# Patient Record
Sex: FEMALE | Race: WHITE | Employment: OTHER | URBAN - METROPOLITAN AREA
[De-identification: names, ages, dates, MRNs, and addresses within clinical notes are randomized per-mention and may not be internally consistent; named-entity substitution may affect disease eponyms.]

---

## 2021-04-12 ENCOUNTER — OFFICE VISIT (OUTPATIENT)
Dept: FAMILY MEDICINE CLINIC | Age: 51
End: 2021-04-12
Payer: MEDICARE

## 2021-04-12 VITALS
HEART RATE: 90 BPM | WEIGHT: 248.2 LBS | DIASTOLIC BLOOD PRESSURE: 92 MMHG | TEMPERATURE: 96.6 F | RESPIRATION RATE: 20 BRPM | SYSTOLIC BLOOD PRESSURE: 138 MMHG

## 2021-04-12 DIAGNOSIS — Z00.00 ANNUAL PHYSICAL EXAM: Primary | ICD-10-CM

## 2021-04-12 DIAGNOSIS — F31.60 BIPOLAR AFFECTIVE DISORDER, CURRENT EPISODE MIXED, CURRENT EPISODE SEVERITY UNSPECIFIED (HCC): ICD-10-CM

## 2021-04-12 DIAGNOSIS — I10 ESSENTIAL HYPERTENSION: ICD-10-CM

## 2021-04-12 DIAGNOSIS — E55.9 VITAMIN D DEFICIENCY: ICD-10-CM

## 2021-04-12 DIAGNOSIS — E11.65 UNCONTROLLED TYPE 2 DIABETES MELLITUS WITH HYPERGLYCEMIA (HCC): ICD-10-CM

## 2021-04-12 DIAGNOSIS — E78.5 HYPERLIPIDEMIA, UNSPECIFIED HYPERLIPIDEMIA TYPE: ICD-10-CM

## 2021-04-12 DIAGNOSIS — I27.9 CHRONIC PULMONARY HEART DISEASE (HCC): ICD-10-CM

## 2021-04-12 DIAGNOSIS — J44.9 CHRONIC OBSTRUCTIVE PULMONARY DISEASE, UNSPECIFIED COPD TYPE (HCC): ICD-10-CM

## 2021-04-12 DIAGNOSIS — Z12.31 ENCOUNTER FOR SCREENING MAMMOGRAM FOR MALIGNANT NEOPLASM OF BREAST: ICD-10-CM

## 2021-04-12 DIAGNOSIS — R79.89 ELEVATED LFTS: ICD-10-CM

## 2021-04-12 PROCEDURE — 99386 PREV VISIT NEW AGE 40-64: CPT | Performed by: NURSE PRACTITIONER

## 2021-04-12 RX ORDER — LISINOPRIL 30 MG/1
30 TABLET ORAL DAILY
Qty: 30 TABLET | Refills: 1 | Status: SHIPPED | OUTPATIENT
Start: 2021-04-12 | End: 2021-05-06

## 2021-04-12 RX ORDER — AMLODIPINE BESYLATE 5 MG/1
5 TABLET ORAL DAILY
COMMUNITY
Start: 2020-11-30 | End: 2021-04-12 | Stop reason: SDUPTHER

## 2021-04-12 RX ORDER — LISINOPRIL 30 MG/1
30 TABLET ORAL DAILY
COMMUNITY
Start: 2020-11-13 | End: 2021-04-12 | Stop reason: SDUPTHER

## 2021-04-12 RX ORDER — ATENOLOL 50 MG/1
50 TABLET ORAL 2 TIMES DAILY
COMMUNITY
Start: 2021-03-05 | End: 2021-04-12 | Stop reason: SDUPTHER

## 2021-04-12 RX ORDER — ATORVASTATIN CALCIUM 40 MG/1
40 TABLET, FILM COATED ORAL DAILY
COMMUNITY
Start: 2020-09-08 | End: 2021-04-12 | Stop reason: SDUPTHER

## 2021-04-12 RX ORDER — CHLORAL HYDRATE 500 MG
2 CAPSULE ORAL 2 TIMES DAILY
Status: ON HOLD | COMMUNITY
Start: 2020-11-25 | End: 2021-05-21

## 2021-04-12 RX ORDER — CLONAZEPAM 0.5 MG/1
0.5 TABLET ORAL 2 TIMES DAILY PRN
COMMUNITY
Start: 2021-04-01

## 2021-04-12 RX ORDER — BENZONATATE 100 MG/1
100-200 CAPSULE ORAL 3 TIMES DAILY PRN
Qty: 60 CAPSULE | Refills: 2 | Status: ON HOLD | OUTPATIENT
Start: 2021-04-12 | End: 2021-05-22 | Stop reason: HOSPADM

## 2021-04-12 RX ORDER — BUPROPION HYDROCHLORIDE 150 MG/1
300 TABLET ORAL EVERY MORNING
Status: ON HOLD | COMMUNITY
Start: 2021-03-05 | End: 2021-05-21

## 2021-04-12 RX ORDER — DICYCLOMINE HCL 20 MG
20 TABLET ORAL 3 TIMES DAILY PRN
COMMUNITY
Start: 2020-09-14 | End: 2021-06-10 | Stop reason: SDUPTHER

## 2021-04-12 RX ORDER — METFORMIN HYDROCHLORIDE 500 MG/1
1000 TABLET, EXTENDED RELEASE ORAL 2 TIMES DAILY
COMMUNITY
Start: 2020-09-17 | End: 2021-04-12 | Stop reason: SDUPTHER

## 2021-04-12 RX ORDER — DOXEPIN HYDROCHLORIDE 10 MG/1
20 CAPSULE ORAL NIGHTLY
COMMUNITY
Start: 2021-04-07

## 2021-04-12 RX ORDER — ATORVASTATIN CALCIUM 40 MG/1
40 TABLET, FILM COATED ORAL DAILY
Qty: 30 TABLET | Refills: 1 | Status: SHIPPED | OUTPATIENT
Start: 2021-04-12 | End: 2021-05-06

## 2021-04-12 RX ORDER — AMLODIPINE BESYLATE 5 MG/1
5 TABLET ORAL DAILY
Qty: 30 TABLET | Refills: 11 | Status: SHIPPED | OUTPATIENT
Start: 2021-04-12 | End: 2022-04-12

## 2021-04-12 RX ORDER — TRAZODONE HYDROCHLORIDE 50 MG/1
50 TABLET ORAL NIGHTLY
Status: ON HOLD | COMMUNITY
End: 2021-05-21

## 2021-04-12 RX ORDER — ATENOLOL 50 MG/1
50 TABLET ORAL 2 TIMES DAILY
Qty: 60 TABLET | Refills: 1 | Status: SHIPPED | OUTPATIENT
Start: 2021-04-12 | End: 2021-05-06

## 2021-04-12 RX ORDER — PHENOL 1.4 %
10 AEROSOL, SPRAY (ML) MUCOUS MEMBRANE NIGHTLY PRN
COMMUNITY

## 2021-04-12 RX ORDER — OXCARBAZEPINE 150 MG/1
150 TABLET, FILM COATED ORAL 2 TIMES DAILY
COMMUNITY
Start: 2021-03-24

## 2021-04-12 RX ORDER — METFORMIN HYDROCHLORIDE 500 MG/1
1000 TABLET, EXTENDED RELEASE ORAL 2 TIMES DAILY
Qty: 120 TABLET | Refills: 1 | Status: SHIPPED | OUTPATIENT
Start: 2021-04-12 | End: 2021-05-25

## 2021-04-12 SDOH — ECONOMIC STABILITY: FOOD INSECURITY: WITHIN THE PAST 12 MONTHS, THE FOOD YOU BOUGHT JUST DIDN'T LAST AND YOU DIDN'T HAVE MONEY TO GET MORE.: NEVER TRUE

## 2021-04-12 SDOH — ECONOMIC STABILITY: TRANSPORTATION INSECURITY
IN THE PAST 12 MONTHS, HAS THE LACK OF TRANSPORTATION KEPT YOU FROM MEDICAL APPOINTMENTS OR FROM GETTING MEDICATIONS?: NO

## 2021-04-12 SDOH — ECONOMIC STABILITY: TRANSPORTATION INSECURITY
IN THE PAST 12 MONTHS, HAS LACK OF TRANSPORTATION KEPT YOU FROM MEETINGS, WORK, OR FROM GETTING THINGS NEEDED FOR DAILY LIVING?: NO

## 2021-04-12 SDOH — ECONOMIC STABILITY: INCOME INSECURITY: HOW HARD IS IT FOR YOU TO PAY FOR THE VERY BASICS LIKE FOOD, HOUSING, MEDICAL CARE, AND HEATING?: NOT HARD AT ALL

## 2021-04-12 SDOH — ECONOMIC STABILITY: FOOD INSECURITY: WITHIN THE PAST 12 MONTHS, YOU WORRIED THAT YOUR FOOD WOULD RUN OUT BEFORE YOU GOT MONEY TO BUY MORE.: NEVER TRUE

## 2021-04-12 ASSESSMENT — PATIENT HEALTH QUESTIONNAIRE - PHQ9
1. LITTLE INTEREST OR PLEASURE IN DOING THINGS: 0
SUM OF ALL RESPONSES TO PHQ QUESTIONS 1-9: 0
2. FEELING DOWN, DEPRESSED OR HOPELESS: 0

## 2021-04-12 NOTE — PROGRESS NOTES
No results found for: LABA1C  No results found for: LABMICR, CREATININE  No results found for: ALT, AST  No results found for: CHOL, TRIG, HDL, LDLCALC, LDLDIRECT       Follows up with Minda on 4/21/2021 for bipolar and medications. PTSD and anxiety. Wellbutrin, trileptal and klonopin. COPD- cough for months. She was taking tessalon for the cough. Has not followed up with pulmonary for her COPD recently. Review of Systems   Constitutional: Negative for chills, fatigue and fever. HENT: Negative for congestion, facial swelling, sinus pain, sore throat and trouble swallowing. Eyes: Negative for pain and visual disturbance. Respiratory: Positive for cough (COPD). Negative for shortness of breath and wheezing. Cardiovascular: Positive for leg swelling. Negative for chest pain and palpitations. Gastrointestinal: Negative for abdominal pain, diarrhea, nausea and vomiting. Genitourinary: Negative for difficulty urinating, dysuria and urgency. Musculoskeletal: Negative for back pain, gait problem and neck pain. Skin: Negative for color change and rash. Neurological: Negative for dizziness, weakness and headaches. Psychiatric/Behavioral: Negative for agitation and sleep disturbance. The patient is not nervous/anxious. Prior to Visit Medications    Medication Sig Taking?  Authorizing Provider   benzonatate (TESSALON) 100 MG capsule Take 1-2 capsules by mouth 3 times daily as needed for Cough Yes NAIF Simons CNP   dicyclomine (BENTYL) 20 MG tablet Take 20 mg by mouth 3 times daily as needed Yes Historical Provider, MD   Omega-3 Fatty Acids (FISH OIL) 1000 MG CAPS Take 2 g by mouth 2 times daily Yes Historical Provider, MD   insulin aspart (NOVOLOG) 100 UNIT/ML injection vial Inject 12 Units into the skin 3 times daily (before meals) 12 unit AM, 12 units Afternoon, 24 units in PM Yes NAIF Simons CNP   amLODIPine (NORVASC) 5 MG tablet Take 1 tablet by mouth daily Yes Branden Borja, NAIF - CNP   atenolol (TENORMIN) 50 MG tablet Take 1 tablet by mouth 2 times daily Yes Branden BorjaNAIF - CNP   atorvastatin (LIPITOR) 40 MG tablet Take 1 tablet by mouth daily Yes Branden Borja, APRN - CNP   lisinopril (PRINIVIL;ZESTRIL) 30 MG tablet Take 1 tablet by mouth daily Yes Branden Borja, APRN - CNP   metFORMIN (GLUCOPHAGE-XR) 500 MG extended release tablet Take 2 tablets by mouth 2 times daily Yes Branden Borja, APRN - CNP   buPROPion (WELLBUTRIN XL) 150 MG extended release tablet   Historical Provider, MD   clonazePAM (KLONOPIN) 0.5 MG tablet Take 0.5 mg by mouth 2 times daily as needed. Historical Provider, MD   doxepin (SINEQUAN) 10 MG capsule   Historical Provider, MD   Melatonin 10 MG TABS Take 10 mg by mouth nightly as needed  Historical Provider, MD   OXcarbazepine (TRILEPTAL) 150 MG tablet   Historical Provider, MD   traZODone (DESYREL) 50 MG tablet Take 50 mg by mouth nightly  Historical Provider, MD        Allergies   Allergen Reactions    Adhesive Tape     Penicillins        No past medical history on file. No past surgical history on file.     Social History     Socioeconomic History    Marital status: Single     Spouse name: Not on file    Number of children: Not on file    Years of education: Not on file    Highest education level: Not on file   Occupational History    Not on file   Social Needs    Financial resource strain: Not hard at all    Food insecurity     Worry: Never true     Inability: Never true   [employer] needs     Medical: No     Non-medical: No   Tobacco Use    Smoking status: Current Every Day Smoker     Packs/day: 1.50     Years: 41.00     Pack years: 61.50    Smokeless tobacco: Never Used   Substance and Sexual Activity    Alcohol use: Not on file    Drug use: Not on file    Sexual activity: Not on file   Lifestyle    Physical activity     Days per week: Not on file     Minutes per session: Not on file    Stress: Not on file Relationships    Social connections     Talks on phone: Not on file     Gets together: Not on file     Attends Anabaptism service: Not on file     Active member of club or organization: Not on file     Attends meetings of clubs or organizations: Not on file     Relationship status: Not on file    Intimate partner violence     Fear of current or ex partner: Not on file     Emotionally abused: Not on file     Physically abused: Not on file     Forced sexual activity: Not on file   Other Topics Concern    Not on file   Social History Narrative    Not on file        No family history on file. Vitals:    04/12/21 1310 04/12/21 1314   BP: (!) 138/90 (!) 138/92   Site: Left Upper Arm Left Upper Arm   Position: Sitting Sitting   Cuff Size: Large Adult Large Adult   Pulse: 90    Resp: 20    Temp: 96.6 °F (35.9 °C)    TempSrc: Temporal    Weight: 248 lb 3.2 oz (112.6 kg)      There is no height or weight on file to calculate BMI. Physical Exam  Vitals signs reviewed. Constitutional:       General: She is not in acute distress. Appearance: Normal appearance. She is well-developed. HENT:      Head: Normocephalic and atraumatic. Right Ear: Hearing, tympanic membrane, ear canal and external ear normal.      Left Ear: Hearing, tympanic membrane, ear canal and external ear normal.      Nose: Nose normal. No nasal tenderness. Mouth/Throat:      Lips: Pink. Mouth: Mucous membranes are moist. No oral lesions. Pharynx: Oropharynx is clear. Uvula midline. Eyes:      General:         Right eye: No discharge. Left eye: No discharge. Conjunctiva/sclera: Conjunctivae normal.   Neck:      Musculoskeletal: Full passive range of motion without pain, normal range of motion and neck supple. Vascular: No carotid bruit. Trachea: No tracheal deviation. Cardiovascular:      Rate and Rhythm: Normal rate and regular rhythm. Pulses: Normal pulses.       Heart sounds: Normal heart sounds. No murmur. Pulmonary:      Effort: Pulmonary effort is normal. No respiratory distress. Breath sounds: Normal breath sounds. Abdominal:      General: Bowel sounds are normal.      Palpations: Abdomen is soft. Tenderness: There is no abdominal tenderness. There is no right CVA tenderness or left CVA tenderness. Musculoskeletal:      Right lower leg: Edema present. Left lower leg: Edema present. Lymphadenopathy:      Head:      Right side of head: No submental, submandibular, tonsillar, preauricular, posterior auricular or occipital adenopathy. Left side of head: No submental, submandibular, tonsillar, preauricular, posterior auricular or occipital adenopathy. Cervical: No cervical adenopathy. Skin:     General: Skin is warm and dry. Findings: No rash. Neurological:      General: No focal deficit present. Mental Status: She is alert and oriented to person, place, and time. Coordination: Coordination normal.   Psychiatric:         Mood and Affect: Mood normal.         Behavior: Behavior normal.         Thought Content: Thought content normal.         Judgment: Judgment normal.         ASSESSMENT/PLAN:  1. Annual physical exam    2. Bipolar affective disorder, current episode mixed, current episode severity unspecified ([facility] [address]    3. Chronic obstructive pulmonary disease, unspecified COPD type ([facility] [address]  - Amos Apley, MD, Pulmonary Disease, [facility]    4. Uncontrolled type 2 diabetes mellitus with hyperglycemia (HCC)  - Microalbumin / Creatinine Urine Ratio; Future  - Hemoglobin A1C; Future  - [facility]  - insulin aspart (NOVOLOG) 100 UNIT/ML injection vial; Inject 12 Units into the skin 3 times daily (before meals) 12 unit AM, 12 units Afternoon, 24 units in PM  Dispense: 1 vial; Refill: 3  - metFORMIN (GLUCOPHAGE-XR) 500 MG extended release tablet; Take 2 tablets by mouth 2 times daily  Dispense: 120 tablet; Refill: 1    5.  Essential hypertension  - TSH without Reflex; Future  - T4, Free; Future  - Comprehensive Metabolic Panel; Future  - CBC Auto Differential; Future  - Nishant Garza MD, Cardiology, [facility] [address] [facility]  - amLODIPine (NORVASC) 5 MG tablet; Take 1 tablet by mouth daily  Dispense: 30 tablet; Refill: 11  - atenolol (TENORMIN) 50 MG tablet; Take 1 tablet by mouth 2 times daily  Dispense: 60 tablet; Refill: 1  - lisinopril (PRINIVIL;ZESTRIL) 30 MG tablet; Take 1 tablet by mouth daily  Dispense: 30 tablet; Refill: 1    6. Elevated LFTs    7. Hyperlipidemia, unspecified hyperlipidemia type  - Lipid Panel; Future  - [facility]  - atorvastatin (LIPITOR) 40 MG tablet; Take 1 tablet by mouth daily  Dispense: 30 tablet; Refill: 1    8. Vitamin D deficiency  - Vitamin D 25 Hydroxy; Future    9. [facility] pulmonary [facility] disease ([facility]  - Nishant Garza MD, Cardiology, [facility] SONJA JEAN    10. Encounter for screening mammogram for malignant neoplasm of breast  - MIRTHA DIGITAL POST SCREEN W OR WO CAD BILATERAL; Future    - Have labs completed after 12 hours fasting  - Appointment made for pt for COVID vaccine on 4/13/21  - Appointment made for Mammogram for 4/14/2021 (last mammo done 7/16/2019 per care everywhere)  - Colonoscopy- Done 7/2/2019 per care everywhere    Return in about 2 weeks (around 4/26/2021), or if symptoms worsen or fail to improve, for Routine follow up, Medication check, Result discussion. Patient given educational materials - see patient instructions. Discussed use, benefit, and side effects of prescribed medications. All patient questions answered. Pt voiced understanding. Reviewed health maintenance. An  electronic signature was used to authenticate this note.     --NAIF Watkins - CNP on 4/13/2021 at 10:15 AM

## 2021-04-13 ENCOUNTER — IMMUNIZATION (OUTPATIENT)
Dept: PRIMARY CARE CLINIC | Age: 51
End: 2021-04-13
Payer: MEDICARE

## 2021-04-13 PROCEDURE — 91300 COVID-19, PFIZER VACCINE 30MCG/0.3ML DOSE: CPT

## 2021-04-13 PROCEDURE — 0001A COVID-19, PFIZER VACCINE 30MCG/0.3ML DOSE: CPT

## 2021-04-13 ASSESSMENT — ENCOUNTER SYMPTOMS
ABDOMINAL PAIN: 0
DIARRHEA: 0
NAUSEA: 0
TROUBLE SWALLOWING: 0
SORE THROAT: 0
BACK PAIN: 0
WHEEZING: 0
COLOR CHANGE: 0
EYE PAIN: 0
FACIAL SWELLING: 0
SINUS PAIN: 0
VOMITING: 0
COUGH: 1
SHORTNESS OF BREATH: 0

## 2021-04-14 ENCOUNTER — HOSPITAL ENCOUNTER (OUTPATIENT)
Dept: WOMENS IMAGING | Age: 51
Discharge: HOME | End: 2021-04-14
Payer: MEDICARE

## 2021-04-14 DIAGNOSIS — Z12.31 ENCOUNTER FOR SCREENING MAMMOGRAM FOR MALIGNANT NEOPLASM OF BREAST: ICD-10-CM

## 2021-04-14 PROCEDURE — 77063 BREAST TOMOSYNTHESIS BI: CPT

## 2021-04-19 ENCOUNTER — NURSE ONLY (OUTPATIENT)
Dept: LAB | Age: 51
End: 2021-04-19

## 2021-04-19 DIAGNOSIS — E78.5 HYPERLIPIDEMIA, UNSPECIFIED HYPERLIPIDEMIA TYPE: ICD-10-CM

## 2021-04-19 DIAGNOSIS — E55.9 VITAMIN D DEFICIENCY: ICD-10-CM

## 2021-04-19 DIAGNOSIS — E11.65 UNCONTROLLED TYPE 2 DIABETES MELLITUS WITH HYPERGLYCEMIA (HCC): ICD-10-CM

## 2021-04-19 DIAGNOSIS — I10 ESSENTIAL HYPERTENSION: ICD-10-CM

## 2021-04-19 LAB
ALBUMIN SERPL-MCNC: 4.3 G/DL (ref 3.5–5.1)
ALP BLD-CCNC: 80 U/L (ref 38–126)
ALT SERPL-CCNC: 22 U/L (ref 11–66)
ANION GAP SERPL CALCULATED.3IONS-SCNC: 15 MEQ/L (ref 8–16)
AST SERPL-CCNC: 20 U/L (ref 5–40)
AVERAGE GLUCOSE: 189 MG/DL (ref 70–126)
BASOPHILS # BLD: 0.8 %
BASOPHILS ABSOLUTE: 0.1 THOU/MM3 (ref 0–0.1)
BILIRUB SERPL-MCNC: 0.7 MG/DL (ref 0.3–1.2)
BUN BLDV-MCNC: 10 MG/DL (ref 7–22)
CALCIUM SERPL-MCNC: 9 MG/DL (ref 8.5–10.5)
CHLORIDE BLD-SCNC: 97 MEQ/L (ref 98–111)
CHOLESTEROL, TOTAL: 215 MG/DL (ref 100–199)
CO2: 24 MEQ/L (ref 23–33)
CREAT SERPL-MCNC: 0.7 MG/DL (ref 0.4–1.2)
CREATININE, URINE: 297.2 MG/DL
EOSINOPHIL # BLD: 1.8 %
EOSINOPHILS ABSOLUTE: 0.2 THOU/MM3 (ref 0–0.4)
ERYTHROCYTE [DISTWIDTH] IN BLOOD BY AUTOMATED COUNT: 13.9 % (ref 11.5–14.5)
ERYTHROCYTE [DISTWIDTH] IN BLOOD BY AUTOMATED COUNT: 46.2 FL (ref 35–45)
GFR SERPL CREATININE-BSD FRML MDRD: 88 ML/MIN/1.73M2
GLUCOSE BLD-MCNC: 190 MG/DL (ref 70–108)
HBA1C MFR BLD: 8.3 % (ref 4.4–6.4)
HCT VFR BLD CALC: 53.8 % (ref 37–47)
HDLC SERPL-MCNC: 32 MG/DL
HEMOGLOBIN: 17.8 GM/DL (ref 12–16)
IMMATURE GRANS (ABS): 0.05 THOU/MM3 (ref 0–0.07)
IMMATURE GRANULOCYTES: 0.4 %
LDL CHOLESTEROL CALCULATED: 121 MG/DL
LYMPHOCYTES # BLD: 24.5 %
LYMPHOCYTES ABSOLUTE: 2.8 THOU/MM3 (ref 1–4.8)
MCH RBC QN AUTO: 30.2 PG (ref 26–33)
MCHC RBC AUTO-ENTMCNC: 33.1 GM/DL (ref 32.2–35.5)
MCV RBC AUTO: 91.2 FL (ref 81–99)
MICROALBUMIN UR-MCNC: 188.75 MG/DL
MICROALBUMIN/CREAT UR-RTO: 635 MG/G (ref 0–30)
MONOCYTES # BLD: 5.5 %
MONOCYTES ABSOLUTE: 0.6 THOU/MM3 (ref 0.4–1.3)
NUCLEATED RED BLOOD CELLS: 0 /100 WBC
PLATELET # BLD: 245 THOU/MM3 (ref 130–400)
PMV BLD AUTO: 9.6 FL (ref 9.4–12.4)
POTASSIUM SERPL-SCNC: 3.5 MEQ/L (ref 3.5–5.2)
RBC # BLD: 5.9 MILL/MM3 (ref 4.2–5.4)
SEG NEUTROPHILS: 67 %
SEGMENTED NEUTROPHILS ABSOLUTE COUNT: 7.8 THOU/MM3 (ref 1.8–7.7)
SODIUM BLD-SCNC: 136 MEQ/L (ref 135–145)
T4 FREE: 1.28 NG/DL (ref 0.93–1.76)
TOTAL PROTEIN: 7.6 G/DL (ref 6.1–8)
TRIGL SERPL-MCNC: 310 MG/DL (ref 0–199)
TSH SERPL DL<=0.05 MIU/L-ACNC: 1.21 UIU/ML (ref 0.4–4.2)
VITAMIN D 25-HYDROXY: 21 NG/ML (ref 30–100)
WBC # BLD: 11.6 THOU/MM3 (ref 4.8–10.8)

## 2021-04-20 ENCOUNTER — TELEPHONE (OUTPATIENT)
Dept: FAMILY MEDICINE CLINIC | Age: 51
End: 2021-04-20

## 2021-04-21 ENCOUNTER — HOSPITAL ENCOUNTER (OUTPATIENT)
Dept: WOMENS IMAGING | Age: 51
Discharge: HOME | End: 2021-04-21

## 2021-04-21 DIAGNOSIS — Z00.6 ENCOUNTER FOR EXAMINATION FOR NORMAL COMPARISON OR CONTROL IN CLINICAL RESEARCH PROGRAM: ICD-10-CM

## 2021-04-21 RX ORDER — INSULIN ASPART 100 [IU]/ML
12 INJECTION, SOLUTION INTRAVENOUS; SUBCUTANEOUS
Qty: 5 PEN | Refills: 3 | Status: SHIPPED | OUTPATIENT
Start: 2021-04-21

## 2021-04-21 NOTE — TELEPHONE ENCOUNTER
Noted.  New order placed. -WS    Orders Placed This Encounter   Medications    insulin aspart (NOVOLOG FLEXPEN) 100 UNIT/ML injection pen     Sig: Inject 12 Units into the skin 3 times daily (before meals) Inject 12 units before breakfast and lunch and 24 units before dinner.      Dispense:  5 pen     Refill:  3

## 2021-04-26 ENCOUNTER — PATIENT MESSAGE (OUTPATIENT)
Dept: FAMILY MEDICINE CLINIC | Age: 51
End: 2021-04-26

## 2021-04-26 ENCOUNTER — OFFICE VISIT (OUTPATIENT)
Dept: FAMILY MEDICINE CLINIC | Age: 51
End: 2021-04-26
Payer: MEDICARE

## 2021-04-26 VITALS
WEIGHT: 248 LBS | SYSTOLIC BLOOD PRESSURE: 136 MMHG | DIASTOLIC BLOOD PRESSURE: 72 MMHG | RESPIRATION RATE: 18 BRPM | HEART RATE: 68 BPM

## 2021-04-26 DIAGNOSIS — Z96.89 S/P INSERTION OF SPINAL CORD STIMULATOR: ICD-10-CM

## 2021-04-26 DIAGNOSIS — I10 ESSENTIAL HYPERTENSION: ICD-10-CM

## 2021-04-26 DIAGNOSIS — E78.5 HYPERLIPIDEMIA, UNSPECIFIED HYPERLIPIDEMIA TYPE: ICD-10-CM

## 2021-04-26 DIAGNOSIS — F17.200 TOBACCO USE DISORDER: ICD-10-CM

## 2021-04-26 DIAGNOSIS — E11.65 UNCONTROLLED TYPE 2 DIABETES MELLITUS WITH HYPERGLYCEMIA (HCC): Primary | ICD-10-CM

## 2021-04-26 DIAGNOSIS — M51.36 DDD (DEGENERATIVE DISC DISEASE), LUMBAR: ICD-10-CM

## 2021-04-26 DIAGNOSIS — F31.60 BIPOLAR AFFECTIVE DISORDER, CURRENT EPISODE MIXED, CURRENT EPISODE SEVERITY UNSPECIFIED (HCC): ICD-10-CM

## 2021-04-26 PROCEDURE — G8427 DOCREV CUR MEDS BY ELIG CLIN: HCPCS | Performed by: NURSE PRACTITIONER

## 2021-04-26 PROCEDURE — 3017F COLORECTAL CA SCREEN DOC REV: CPT | Performed by: NURSE PRACTITIONER

## 2021-04-26 PROCEDURE — G8421 BMI NOT CALCULATED: HCPCS | Performed by: NURSE PRACTITIONER

## 2021-04-26 PROCEDURE — 2022F DILAT RTA XM EVC RTNOPTHY: CPT | Performed by: NURSE PRACTITIONER

## 2021-04-26 PROCEDURE — 99214 OFFICE O/P EST MOD 30 MIN: CPT | Performed by: NURSE PRACTITIONER

## 2021-04-26 PROCEDURE — 4004F PT TOBACCO SCREEN RCVD TLK: CPT | Performed by: NURSE PRACTITIONER

## 2021-04-26 PROCEDURE — 3052F HG A1C>EQUAL 8.0%<EQUAL 9.0%: CPT | Performed by: NURSE PRACTITIONER

## 2021-04-26 RX ORDER — GLIMEPIRIDE 2 MG/1
2 TABLET ORAL EVERY MORNING
Qty: 90 TABLET | Refills: 1 | Status: SHIPPED | OUTPATIENT
Start: 2021-04-26

## 2021-04-26 RX ORDER — METAXALONE 800 MG/1
800 TABLET ORAL 3 TIMES DAILY
Qty: 90 TABLET | Refills: 3 | Status: SHIPPED | OUTPATIENT
Start: 2021-04-26 | End: 2021-04-27

## 2021-04-26 NOTE — PROGRESS NOTES
[facility]  [address] [address] [address]  Dept: [phone]  Dept Fax: [phone]: [phone]     2021     Dejuan Walker (:  1970) is a 44 y.o. female, here for evaluation of the following medical concerns:    Chief Complaint   Patient presents with    Follow-up     does complain of worsening body aches, does not feel currentyl ill, not sleeping well        HPI    Pt presents to the office today for 2 week follow up after new patient appt and labs. Pt has appointments scheduled for diabetic clinic and cardiology. Treatment Adherence:   Medication compliance:  compliant all of the time  Diet compliance:  compliant most of the time  Weight trend: stable  Current exercise: no regular exercise  Barriers: impairment:  physical: chronic pain    Diabetes Mellitus Type 2: Current symptoms/problems include none. Home blood sugar records: fasting range: 150's  Any episodes of hypoglycemia? no  Eye exam current (within one year): no  Tobacco history: She  reports that she has been smoking. She has a 61.50 pack-year smoking history. She has never used smokeless tobacco.   Daily Aspirin? Yes    Hypertension:  Home blood pressure monitoring: No.  She is adherent to a low sodium diet. Patient denies chest pain, shortness of breath, headache and lightheadedness. Antihypertensive medication side effects: no medication side effects noted. Use of agents associated with hypertension: none. Hyperlipidemia:  No new myalgias or GI upset on atorvastatin (Lipitor).        Lab Results   Component Value Date    LABA1C 8.3 (H) 2021     Lab Results   Component Value Date    LABMICR 188.75 2021    CREATININE 0.7 2021     Lab Results   Component Value Date    ALT 22 2021    AST 20 2021     Lab Results   Component Value Date    CHOL 215 (H) 2021    TRIG 310 (H) 2021    HDL 32 2021    LDLCALC 121 04/19/2021        Pt does report that she is having increased back pain. She has a nerve stimulator in place, but it is not working as well as it has in the past.  She would like a referral to pain management for possible injections or nerve ablation. Bipolar- Pt needs a referral to [facility] for Dr Govind Weems and continued psychotherapy for continued services for her insurance. The insurance will no longer cover the NP. Next appt is May 18th. Review of Systems   Constitutional: Negative for chills, fatigue and fever. HENT: Negative for congestion, facial swelling, sinus pain, sore throat and trouble swallowing. Eyes: Negative for pain and visual disturbance. Respiratory: Negative for cough, shortness of breath and wheezing. Cardiovascular: Negative for chest pain and palpitations. Gastrointestinal: Negative for abdominal pain, diarrhea, nausea and vomiting. Genitourinary: Negative for difficulty urinating, dysuria and urgency. Musculoskeletal: Positive for arthralgias, back pain and myalgias. Negative for gait problem and neck pain. Skin: Negative for color change and rash. Neurological: Negative for dizziness, weakness and headaches. Psychiatric/Behavioral: Negative for agitation and sleep disturbance. The patient is not nervous/anxious. Prior to Visit Medications    Medication Sig Taking? Authorizing Provider   glimepiride (AMARYL) 2 MG tablet Take 1 tablet by mouth every morning Yes NAIF Garcia CNP   metaxalone (SKELAXIN) 800 MG tablet Take 1 tablet by mouth 3 times daily Yes NAIF Garcia CNP   insulin aspart (NOVOLOG FLEXPEN) 100 UNIT/ML injection pen Inject 12 Units into the skin 3 times daily (before meals) Inject 12 units before breakfast and lunch and 24 units before dinner.  Yes NAIF Garcia CNP   benzonatate (TESSALON) 100 MG capsule Take 1-2 capsules by mouth 3 times daily as needed for Cough Yes NAIF Garcia CNP   buPROPion

## 2021-04-27 RX ORDER — CYCLOBENZAPRINE HCL 10 MG
10 TABLET ORAL 3 TIMES DAILY PRN
Qty: 90 TABLET | Refills: 2 | Status: ON HOLD | OUTPATIENT
Start: 2021-04-27 | End: 2021-05-22 | Stop reason: HOSPADM

## 2021-04-27 ASSESSMENT — ENCOUNTER SYMPTOMS
ABDOMINAL PAIN: 0
COUGH: 0
COLOR CHANGE: 0
SHORTNESS OF BREATH: 0
EYE PAIN: 0
TROUBLE SWALLOWING: 0
BACK PAIN: 1
FACIAL SWELLING: 0
SORE THROAT: 0
DIARRHEA: 0
WHEEZING: 0
VOMITING: 0
NAUSEA: 0
SINUS PAIN: 0

## 2021-04-27 NOTE — PROGRESS NOTES
I called [facility] at [phone] and spoke to [facility] and there is no referral needed for Dr. Meryle Beets or Ander Steinberg because they are both in network. Call reference number is [id number]. Dr. Bear Diss [phone]  Ander Steinberg NPI [id number]     Pt notified.

## 2021-05-05 DIAGNOSIS — E78.5 HYPERLIPIDEMIA, UNSPECIFIED HYPERLIPIDEMIA TYPE: ICD-10-CM

## 2021-05-05 DIAGNOSIS — I10 ESSENTIAL HYPERTENSION: ICD-10-CM

## 2021-05-06 RX ORDER — ATORVASTATIN CALCIUM 40 MG/1
TABLET, FILM COATED ORAL
Qty: 30 TABLET | Refills: 1 | Status: SHIPPED | OUTPATIENT
Start: 2021-05-06

## 2021-05-06 RX ORDER — LISINOPRIL 30 MG/1
TABLET ORAL
Qty: 30 TABLET | Refills: 1 | Status: SHIPPED | OUTPATIENT
Start: 2021-05-06

## 2021-05-06 RX ORDER — ATENOLOL 50 MG/1
50 TABLET ORAL 2 TIMES DAILY
Qty: 60 TABLET | Refills: 1 | Status: SHIPPED | OUTPATIENT
Start: 2021-05-06 | End: 2021-05-06 | Stop reason: SDUPTHER

## 2021-05-06 RX ORDER — ATENOLOL 50 MG/1
50 TABLET ORAL 2 TIMES DAILY
Qty: 60 TABLET | Refills: 1 | Status: SHIPPED | OUTPATIENT
Start: 2021-05-06 | End: 2021-07-22

## 2021-05-13 ENCOUNTER — IMMUNIZATION (OUTPATIENT)
Dept: PRIMARY CARE CLINIC | Age: 51
End: 2021-05-13
Payer: MEDICARE

## 2021-05-13 PROCEDURE — 91300 COVID-19, PFIZER VACCINE 30MCG/0.3ML DOSE: CPT | Performed by: FAMILY MEDICINE

## 2021-05-13 PROCEDURE — 0002A COVID-19, PFIZER VACCINE 30MCG/0.3ML DOSE: CPT | Performed by: FAMILY MEDICINE

## 2021-05-18 ENCOUNTER — HOSPITAL ENCOUNTER (OUTPATIENT)
Dept: WOMENS IMAGING | Age: 51
Discharge: HOME | DRG: 065 | End: 2021-05-18
Payer: MEDICARE

## 2021-05-18 DIAGNOSIS — R92.2 BREAST DENSITY: ICD-10-CM

## 2021-05-18 PROCEDURE — 76642 ULTRASOUND BREAST LIMITED: CPT

## 2021-05-21 ENCOUNTER — APPOINTMENT (OUTPATIENT)
Dept: CT IMAGING | Age: 51
DRG: 065 | End: 2021-05-21
Payer: MEDICARE

## 2021-05-21 ENCOUNTER — APPOINTMENT (OUTPATIENT)
Dept: INTERVENTIONAL RADIOLOGY/VASCULAR | Age: 51
DRG: 065 | End: 2021-05-21
Payer: MEDICARE

## 2021-05-21 ENCOUNTER — HOSPITAL ENCOUNTER (INPATIENT)
Age: 51
LOS: 1 days | Discharge: HOME | DRG: 065 | End: 2021-05-22
Attending: EMERGENCY MEDICINE
Payer: MEDICARE

## 2021-05-21 DIAGNOSIS — R29.818 NEUROLOGICAL ABNORMALITY: Primary | ICD-10-CM

## 2021-05-21 LAB
ANION GAP SERPL CALCULATED.3IONS-SCNC: 12 MEQ/L (ref 8–16)
BACTERIA: ABNORMAL /HPF
BASOPHILS # BLD: 0.8 %
BASOPHILS ABSOLUTE: 0.1 THOU/MM3 (ref 0–0.1)
BILIRUBIN URINE: NEGATIVE
BLOOD, URINE: NEGATIVE
BUN BLDV-MCNC: 9 MG/DL (ref 7–22)
CALCIUM SERPL-MCNC: 9 MG/DL (ref 8.5–10.5)
CASTS 2: ABNORMAL /LPF
CASTS UA: ABNORMAL /LPF
CHARACTER, URINE: CLEAR
CHLORIDE BLD-SCNC: 101 MEQ/L (ref 98–111)
CO2: 23 MEQ/L (ref 23–33)
COLOR: YELLOW
CREAT SERPL-MCNC: 0.6 MG/DL (ref 0.4–1.2)
CRYSTALS, UA: ABNORMAL
EKG ATRIAL RATE: 73 BPM
EKG P AXIS: 56 DEGREES
EKG P-R INTERVAL: 168 MS
EKG Q-T INTERVAL: 398 MS
EKG QRS DURATION: 92 MS
EKG QTC CALCULATION (BAZETT): 438 MS
EKG R AXIS: 47 DEGREES
EKG T AXIS: 48 DEGREES
EKG VENTRICULAR RATE: 73 BPM
EOSINOPHIL # BLD: 2.9 %
EOSINOPHILS ABSOLUTE: 0.2 THOU/MM3 (ref 0–0.4)
EPITHELIAL CELLS, UA: ABNORMAL /HPF
ERYTHROCYTE [DISTWIDTH] IN BLOOD BY AUTOMATED COUNT: 13.5 % (ref 11.5–14.5)
ERYTHROCYTE [DISTWIDTH] IN BLOOD BY AUTOMATED COUNT: 44.5 FL (ref 35–45)
GFR SERPL CREATININE-BSD FRML MDRD: > 90 ML/MIN/1.73M2
GLUCOSE BLD-MCNC: 121 MG/DL (ref 70–108)
GLUCOSE BLD-MCNC: 134 MG/DL (ref 70–108)
GLUCOSE URINE: 250 MG/DL
HCT VFR BLD CALC: 47.4 % (ref 37–47)
HEMOGLOBIN: 15.8 GM/DL (ref 12–16)
IMMATURE GRANS (ABS): 0.05 THOU/MM3 (ref 0–0.07)
IMMATURE GRANULOCYTES: 0.6 %
KETONES, URINE: NEGATIVE
LEUKOCYTE ESTERASE, URINE: ABNORMAL
LV EF: 60 %
LVEF MODALITY: NORMAL
LYMPHOCYTES # BLD: 24.7 %
LYMPHOCYTES ABSOLUTE: 2.1 THOU/MM3 (ref 1–4.8)
MCH RBC QN AUTO: 29.9 PG (ref 26–33)
MCHC RBC AUTO-ENTMCNC: 33.3 GM/DL (ref 32.2–35.5)
MCV RBC AUTO: 89.8 FL (ref 81–99)
MISCELLANEOUS 2: ABNORMAL
MONOCYTES # BLD: 5.7 %
MONOCYTES ABSOLUTE: 0.5 THOU/MM3 (ref 0.4–1.3)
NITRITE, URINE: NEGATIVE
NUCLEATED RED BLOOD CELLS: 0 /100 WBC
OSMOLALITY CALCULATION: 272.6 MOSMOL/KG (ref 275–300)
PH UA: 7 (ref 5–9)
PLATELET # BLD: 215 THOU/MM3 (ref 130–400)
PMV BLD AUTO: 8.9 FL (ref 9.4–12.4)
POTASSIUM REFLEX MAGNESIUM: 3.8 MEQ/L (ref 3.5–5.2)
PROTEIN UA: ABNORMAL
RBC # BLD: 5.28 MILL/MM3 (ref 4.2–5.4)
RBC URINE: ABNORMAL /HPF
RENAL EPITHELIAL, UA: ABNORMAL
SEG NEUTROPHILS: 65.3 %
SEGMENTED NEUTROPHILS ABSOLUTE COUNT: 5.6 THOU/MM3 (ref 1.8–7.7)
SODIUM BLD-SCNC: 136 MEQ/L (ref 135–145)
SPECIFIC GRAVITY, URINE: 1.01 (ref 1–1.03)
UROBILINOGEN, URINE: 0.2 EU/DL (ref 0–1)
WBC # BLD: 8.6 THOU/MM3 (ref 4.8–10.8)
WBC UA: ABNORMAL /HPF
YEAST: ABNORMAL

## 2021-05-21 PROCEDURE — 80048 BASIC METABOLIC PNL TOTAL CA: CPT

## 2021-05-21 PROCEDURE — 99285 EMERGENCY DEPT VISIT HI MDM: CPT

## 2021-05-21 PROCEDURE — 70450 CT HEAD/BRAIN W/O DYE: CPT

## 2021-05-21 PROCEDURE — 2580000003 HC RX 258: Performed by: FAMILY MEDICINE

## 2021-05-21 PROCEDURE — 70496 CT ANGIOGRAPHY HEAD: CPT

## 2021-05-21 PROCEDURE — 36415 COLL VENOUS BLD VENIPUNCTURE: CPT

## 2021-05-21 PROCEDURE — 6360000002 HC RX W HCPCS: Performed by: FAMILY MEDICINE

## 2021-05-21 PROCEDURE — 93010 ELECTROCARDIOGRAM REPORT: CPT | Performed by: INTERNAL MEDICINE

## 2021-05-21 PROCEDURE — 70498 CT ANGIOGRAPHY NECK: CPT

## 2021-05-21 PROCEDURE — 93005 ELECTROCARDIOGRAM TRACING: CPT | Performed by: EMERGENCY MEDICINE

## 2021-05-21 PROCEDURE — 6360000004 HC RX CONTRAST MEDICATION: Performed by: EMERGENCY MEDICINE

## 2021-05-21 PROCEDURE — 81001 URINALYSIS AUTO W/SCOPE: CPT

## 2021-05-21 PROCEDURE — 99223 1ST HOSP IP/OBS HIGH 75: CPT | Performed by: FAMILY MEDICINE

## 2021-05-21 PROCEDURE — 2060000000 HC ICU INTERMEDIATE R&B

## 2021-05-21 PROCEDURE — 6370000000 HC RX 637 (ALT 250 FOR IP): Performed by: FAMILY MEDICINE

## 2021-05-21 PROCEDURE — 93880 EXTRACRANIAL BILAT STUDY: CPT

## 2021-05-21 PROCEDURE — 85025 COMPLETE CBC W/AUTO DIFF WBC: CPT

## 2021-05-21 PROCEDURE — 82948 REAGENT STRIP/BLOOD GLUCOSE: CPT

## 2021-05-21 PROCEDURE — 99221 1ST HOSP IP/OBS SF/LOW 40: CPT | Performed by: PSYCHIATRY & NEUROLOGY

## 2021-05-21 PROCEDURE — 99449 NTRPROF PH1/NTRNET/EHR 31/>: CPT | Performed by: PSYCHIATRY & NEUROLOGY

## 2021-05-21 PROCEDURE — 93306 TTE W/DOPPLER COMPLETE: CPT

## 2021-05-21 PROCEDURE — 6370000000 HC RX 637 (ALT 250 FOR IP): Performed by: EMERGENCY MEDICINE

## 2021-05-21 RX ORDER — DEXTROSE MONOHYDRATE 25 G/50ML
12.5 INJECTION, SOLUTION INTRAVENOUS PRN
Status: DISCONTINUED | OUTPATIENT
Start: 2021-05-21 | End: 2021-05-22 | Stop reason: HOSPADM

## 2021-05-21 RX ORDER — POLYETHYLENE GLYCOL 3350 17 G/17G
17 POWDER, FOR SOLUTION ORAL DAILY PRN
Status: DISCONTINUED | OUTPATIENT
Start: 2021-05-21 | End: 2021-05-22 | Stop reason: HOSPADM

## 2021-05-21 RX ORDER — CLOPIDOGREL 300 MG/1
300 TABLET, FILM COATED ORAL ONCE
Status: COMPLETED | OUTPATIENT
Start: 2021-05-21 | End: 2021-05-21

## 2021-05-21 RX ORDER — OXCARBAZEPINE 300 MG/1
150 TABLET, FILM COATED ORAL DAILY
Status: DISCONTINUED | OUTPATIENT
Start: 2021-05-21 | End: 2021-05-21

## 2021-05-21 RX ORDER — NICOTINE POLACRILEX 4 MG
15 LOZENGE BUCCAL PRN
Status: DISCONTINUED | OUTPATIENT
Start: 2021-05-21 | End: 2021-05-22 | Stop reason: HOSPADM

## 2021-05-21 RX ORDER — ACETAMINOPHEN 650 MG/1
650 SUPPOSITORY RECTAL EVERY 6 HOURS PRN
Status: DISCONTINUED | OUTPATIENT
Start: 2021-05-21 | End: 2021-05-22 | Stop reason: HOSPADM

## 2021-05-21 RX ORDER — VITAMIN B COMPLEX
1 CAPSULE ORAL DAILY
COMMUNITY

## 2021-05-21 RX ORDER — IBUPROFEN 200 MG
600 TABLET ORAL 2 TIMES DAILY PRN
Status: ON HOLD | COMMUNITY
End: 2021-05-22 | Stop reason: HOSPADM

## 2021-05-21 RX ORDER — AMLODIPINE BESYLATE 5 MG/1
5 TABLET ORAL DAILY
Status: DISCONTINUED | OUTPATIENT
Start: 2021-05-21 | End: 2021-05-21

## 2021-05-21 RX ORDER — OXCARBAZEPINE 300 MG/1
150 TABLET, FILM COATED ORAL 2 TIMES DAILY
Status: DISCONTINUED | OUTPATIENT
Start: 2021-05-21 | End: 2021-05-22 | Stop reason: HOSPADM

## 2021-05-21 RX ORDER — BUPROPION HYDROCHLORIDE 300 MG/1
300 TABLET ORAL DAILY
Status: DISCONTINUED | OUTPATIENT
Start: 2021-05-21 | End: 2021-05-22 | Stop reason: HOSPADM

## 2021-05-21 RX ORDER — TRAZODONE HYDROCHLORIDE 50 MG/1
50 TABLET ORAL NIGHTLY
Status: DISCONTINUED | OUTPATIENT
Start: 2021-05-21 | End: 2021-05-21

## 2021-05-21 RX ORDER — SODIUM CHLORIDE 9 MG/ML
25 INJECTION, SOLUTION INTRAVENOUS PRN
Status: DISCONTINUED | OUTPATIENT
Start: 2021-05-21 | End: 2021-05-22 | Stop reason: HOSPADM

## 2021-05-21 RX ORDER — NICOTINE 21 MG/24HR
1 PATCH, TRANSDERMAL 24 HOURS TRANSDERMAL DAILY
Status: DISCONTINUED | OUTPATIENT
Start: 2021-05-21 | End: 2021-05-22 | Stop reason: HOSPADM

## 2021-05-21 RX ORDER — CLONAZEPAM 0.5 MG/1
0.5 TABLET ORAL 2 TIMES DAILY PRN
Status: DISCONTINUED | OUTPATIENT
Start: 2021-05-21 | End: 2021-05-22 | Stop reason: HOSPADM

## 2021-05-21 RX ORDER — ASPIRIN 81 MG/1
81 TABLET, CHEWABLE ORAL ONCE
Status: COMPLETED | OUTPATIENT
Start: 2021-05-21 | End: 2021-05-21

## 2021-05-21 RX ORDER — SODIUM CHLORIDE 0.9 % (FLUSH) 0.9 %
10 SYRINGE (ML) INJECTION EVERY 12 HOURS SCHEDULED
Status: DISCONTINUED | OUTPATIENT
Start: 2021-05-21 | End: 2021-05-22 | Stop reason: HOSPADM

## 2021-05-21 RX ORDER — DIPHENHYDRAMINE HCL 50 MG
50 CAPSULE ORAL EVERY 6 HOURS PRN
Status: ON HOLD | COMMUNITY
End: 2021-05-22 | Stop reason: HOSPADM

## 2021-05-21 RX ORDER — DOXEPIN HYDROCHLORIDE 10 MG/1
20 CAPSULE ORAL NIGHTLY PRN
Status: DISCONTINUED | OUTPATIENT
Start: 2021-05-21 | End: 2021-05-22 | Stop reason: HOSPADM

## 2021-05-21 RX ORDER — SODIUM CHLORIDE 9 MG/ML
INJECTION, SOLUTION INTRAVENOUS CONTINUOUS
Status: DISCONTINUED | OUTPATIENT
Start: 2021-05-21 | End: 2021-05-22 | Stop reason: HOSPADM

## 2021-05-21 RX ORDER — BUPROPION HYDROCHLORIDE 300 MG/1
300 TABLET ORAL EVERY MORNING
COMMUNITY

## 2021-05-21 RX ORDER — SODIUM CHLORIDE 0.9 % (FLUSH) 0.9 %
10 SYRINGE (ML) INJECTION PRN
Status: DISCONTINUED | OUTPATIENT
Start: 2021-05-21 | End: 2021-05-22 | Stop reason: HOSPADM

## 2021-05-21 RX ORDER — ACETAMINOPHEN 325 MG/1
650 TABLET ORAL EVERY 6 HOURS PRN
Status: DISCONTINUED | OUTPATIENT
Start: 2021-05-21 | End: 2021-05-22 | Stop reason: HOSPADM

## 2021-05-21 RX ORDER — ONDANSETRON 2 MG/ML
4 INJECTION INTRAMUSCULAR; INTRAVENOUS EVERY 6 HOURS PRN
Status: DISCONTINUED | OUTPATIENT
Start: 2021-05-21 | End: 2021-05-22 | Stop reason: HOSPADM

## 2021-05-21 RX ORDER — BUPROPION HYDROCHLORIDE 150 MG/1
150 TABLET ORAL DAILY
Status: DISCONTINUED | OUTPATIENT
Start: 2021-05-21 | End: 2021-05-21

## 2021-05-21 RX ORDER — ATORVASTATIN CALCIUM 40 MG/1
40 TABLET, FILM COATED ORAL NIGHTLY
Status: DISCONTINUED | OUTPATIENT
Start: 2021-05-21 | End: 2021-05-22 | Stop reason: HOSPADM

## 2021-05-21 RX ORDER — ASPIRIN 81 MG/1
81 TABLET, CHEWABLE ORAL DAILY
Status: DISCONTINUED | OUTPATIENT
Start: 2021-05-22 | End: 2021-05-22 | Stop reason: HOSPADM

## 2021-05-21 RX ORDER — CYCLOBENZAPRINE HCL 10 MG
10 TABLET ORAL 3 TIMES DAILY PRN
Status: DISCONTINUED | OUTPATIENT
Start: 2021-05-21 | End: 2021-05-22 | Stop reason: HOSPADM

## 2021-05-21 RX ORDER — LISINOPRIL 5 MG/1
5 TABLET ORAL DAILY
Status: DISCONTINUED | OUTPATIENT
Start: 2021-05-21 | End: 2021-05-22 | Stop reason: HOSPADM

## 2021-05-21 RX ORDER — LISINOPRIL 30 MG/1
1 TABLET ORAL DAILY
Status: DISCONTINUED | OUTPATIENT
Start: 2021-05-21 | End: 2021-05-21

## 2021-05-21 RX ORDER — PROMETHAZINE HYDROCHLORIDE 25 MG/1
12.5 TABLET ORAL EVERY 6 HOURS PRN
Status: DISCONTINUED | OUTPATIENT
Start: 2021-05-21 | End: 2021-05-22 | Stop reason: HOSPADM

## 2021-05-21 RX ORDER — DOXEPIN HYDROCHLORIDE 10 MG/1
10 CAPSULE ORAL NIGHTLY PRN
Status: DISCONTINUED | OUTPATIENT
Start: 2021-05-21 | End: 2021-05-21

## 2021-05-21 RX ORDER — DEXTROSE MONOHYDRATE 50 MG/ML
100 INJECTION, SOLUTION INTRAVENOUS PRN
Status: DISCONTINUED | OUTPATIENT
Start: 2021-05-21 | End: 2021-05-22 | Stop reason: HOSPADM

## 2021-05-21 RX ORDER — INSULIN DEGLUDEC INJECTION 100 U/ML
55 INJECTION, SOLUTION SUBCUTANEOUS NIGHTLY
COMMUNITY

## 2021-05-21 RX ORDER — LOPERAMIDE HYDROCHLORIDE 2 MG/1
2 CAPSULE ORAL 4 TIMES DAILY PRN
Status: ON HOLD | COMMUNITY
End: 2021-05-22 | Stop reason: HOSPADM

## 2021-05-21 RX ORDER — AMLODIPINE BESYLATE 5 MG/1
2.5 TABLET ORAL DAILY
Status: DISCONTINUED | OUTPATIENT
Start: 2021-05-21 | End: 2021-05-22 | Stop reason: HOSPADM

## 2021-05-21 RX ADMIN — IOPAMIDOL 80 ML: 755 INJECTION, SOLUTION INTRAVENOUS at 11:37

## 2021-05-21 RX ADMIN — CYCLOBENZAPRINE 10 MG: 10 TABLET, FILM COATED ORAL at 23:43

## 2021-05-21 RX ADMIN — ASPIRIN 81 MG: 81 TABLET, CHEWABLE ORAL at 11:00

## 2021-05-21 RX ADMIN — SODIUM CHLORIDE, PRESERVATIVE FREE 10 ML: 5 INJECTION INTRAVENOUS at 21:28

## 2021-05-21 RX ADMIN — OXCARBAZEPINE 150 MG: 300 TABLET, FILM COATED ORAL at 21:25

## 2021-05-21 RX ADMIN — ATORVASTATIN CALCIUM 40 MG: 40 TABLET, FILM COATED ORAL at 21:25

## 2021-05-21 RX ADMIN — DOXEPIN HYDROCHLORIDE 20 MG: 10 CAPSULE ORAL at 21:26

## 2021-05-21 RX ADMIN — BUPROPION HYDROCHLORIDE 300 MG: 300 TABLET, EXTENDED RELEASE ORAL at 17:47

## 2021-05-21 RX ADMIN — CLOPIDOGREL BISULFATE 300 MG: 300 TABLET, FILM COATED ORAL at 11:00

## 2021-05-21 RX ADMIN — ACETAMINOPHEN 650 MG: 325 TABLET ORAL at 21:25

## 2021-05-21 RX ADMIN — ENOXAPARIN SODIUM 40 MG: 40 INJECTION SUBCUTANEOUS at 17:47

## 2021-05-21 RX ADMIN — SODIUM CHLORIDE: 9 INJECTION, SOLUTION INTRAVENOUS at 17:47

## 2021-05-21 RX ADMIN — LISINOPRIL 5 MG: 5 TABLET ORAL at 17:47

## 2021-05-21 RX ADMIN — AMLODIPINE BESYLATE 2.5 MG: 5 TABLET ORAL at 17:47

## 2021-05-21 ASSESSMENT — PAIN DESCRIPTION - PAIN TYPE
TYPE: CHRONIC PAIN

## 2021-05-21 ASSESSMENT — PAIN DESCRIPTION - LOCATION: LOCATION: BACK

## 2021-05-21 ASSESSMENT — PAIN SCALES - GENERAL
PAINLEVEL_OUTOF10: 3
PAINLEVEL_OUTOF10: 5
PAINLEVEL_OUTOF10: 9

## 2021-05-21 ASSESSMENT — PAIN DESCRIPTION - DESCRIPTORS: DESCRIPTORS: ACHING

## 2021-05-21 ASSESSMENT — PAIN DESCRIPTION - ONSET: ONSET: ON-GOING

## 2021-05-21 ASSESSMENT — PAIN - FUNCTIONAL ASSESSMENT: PAIN_FUNCTIONAL_ASSESSMENT: PREVENTS OR INTERFERES SOME ACTIVE ACTIVITIES AND ADLS

## 2021-05-21 NOTE — ED NOTES
Patient on cell phone and talking with BOLA at bedside. No signs of distress. VSS.       Michelle Hartmann RN  05/21/21 5020

## 2021-05-21 NOTE — ED NOTES
ECHO lab called to determine if bedside testing could be completed at this time. Tech will be down to complete testing.      Jodie Hartmann RN  05/21/21 3251

## 2021-05-21 NOTE — PROGRESS NOTES
Pharmacy Medication History Note      List of current medications patient is taking is complete. Source of information: Patient, external fill history    Changes made to medication list:  Medications removed (include reason, ex. therapy complete or physician discontinued):  Removed Fish Oil (patient no longer takes due to cost)  Removed Trazodone (patient no longer takes)    Medications added/doses adjusted:  Adjusted Wellbutrin 150 mg XL daily to Wellbutrin 300 mg XL daily  Adjusted Doxepin from no dose entered to 20 mg nightly PRN  Adjusted Oxcarbazepine from no dose entered to 150 mg BID  Added Tresiba 55 units SC nightly  Added B-complex daily  Added Vitamin D daily (unknown strength)    Other notes (ex. Recent course of antibiotics, Coumadin dosing)  Denies use of other OTC or herbal medications.     Electronically signed by Radha Watt [facility] on 5/21/2021 at 3:51 PM

## 2021-05-21 NOTE — ED NOTES
ED to inpatient nurses report    Chief Complaint   Patient presents with    Other     off balance    Numbness     left side of face    Aphasia      Present to ED from home  LOC: alert and orientated to name, place, date  Vital signs   Vitals:    05/21/21 1056 05/21/21 1147 05/21/21 1240 05/21/21 1409   BP: 135/83 (!) 152/83  (!) 164/72   Pulse: 71 79 78 82   Resp: 20 20 20 14   Temp:       TempSrc:       SpO2: 97% 97% 97% 95%   Weight:          Oxygen Baseline room air WNL     Current needs required room air WNL Bipap/Cpap No  LDAs:   Peripheral IV 05/21/21 Left Hand (Active)   Site Assessment Clean;Dry; Intact 05/21/21 1410   Line Status Flushed;Normal saline locked 05/21/21 1148   Dressing Status Clean;Dry; Intact 05/21/21 1410   Dressing Intervention New 05/21/21 0915       Peripheral IV 05/21/21 Right Forearm (Active)   Site Assessment Clean;Dry; Intact 05/21/21 1410   Line Status Flushed;Normal saline locked 05/21/21 1410   Dressing Status Clean;Dry; Intact 05/21/21 1410   Dressing Intervention New 05/21/21 0920     Mobility: Requires assistance * 1 for safety  Pending ED orders: none at this time  Present condition: patient resting in cot, talking with family at bedside. VSS. No signs of distress. Call light in reach. Patient is able to move around in bed as needed.        Electronically signed by Maria Elena Chavez RN on 5/21/2021 at 2:38 PM     Maria Elena Chavez RN  05/21/21 3484

## 2021-05-21 NOTE — H&P
Hospitalist - History & Physical      Patient: Poli Zavala    Unit/Bed:07/007A  YOB: 1970  MRN: [id number]   Acct: [de-identified]   PCP: NAIF Alvarado CNP    Date of Service: Pt seen/examined on 05/21/21  and Admitted to Inpatient with expected LOS greater than two midnights due to medical therapy. Chief Complaint: Loss of balance    Assessment and Plan:-  1. Neurological abnormalities likely related to TIA/stroke or other etiology: CTA head showed some abnormality at the distal branches of the right middle cerebral artery, CTA neck atherosclerotic calcification of the left subclavian artery otherwise normal, CT head without contrast did not reveal any intracranial hemorrhage, patient currently has slurred speech with numbness resolved at the left facial area, I will order MRI of the brain to rule out stroke, n.p.o., neuro check every 4 hours, speech eval, IV hydration gently, ASA daily, high intensity lipid-lowering therapy, strict control for modified risk factor including hypertension, diabetes, encouraged smoking cessation, weight loss. Patient received loading dose of Plavix in the ED in addition to ASA, continue ASA daily, Plavix for neurology discretion that we will consult. Also I will order bilateral carotid ultrasound in addition to echocardiogram to rule out any thrombus. 2. Type 2 diabetes uncontrolled: Last A1c 8.3 as of April 21, patient only on sliding scale in addition to glipizide and Metformin, hold glipizide and Metformin while inpatient, start high intensity sliding scale, hypoglycemia protocol, monitor closely, the patient currently n.p.o. needed speech therapy eval before restarting oral hypoglycemic agents. 3. Essential hypertension: We will not reduce blood pressure acutely while patient suspected having stroke, hold on beta-blocker, resume small doses of Norvasc and lisinopril she is already on, she mentioned she does have high blood pressure for long time. 12 Units into the skin 3 times daily (before meals) Inject 12 units before breakfast and lunch and 24 units before dinner. 5 pen 3    benzonatate (TESSALON) 100 MG capsule Take 1-2 capsules by mouth 3 times daily as needed for Cough 60 capsule 2    buPROPion (WELLBUTRIN XL) 150 MG extended release tablet       clonazePAM (KLONOPIN) 0.5 MG tablet Take 0.5 mg by mouth 2 times daily as needed.  dicyclomine (BENTYL) 20 MG tablet Take 20 mg by mouth 3 times daily as needed      doxepin (SINEQUAN) 10 MG capsule       Melatonin 10 MG TABS Take 10 mg by mouth nightly as needed      Omega-3 Fatty Acids (FISH OIL) 1000 MG CAPS Take 2 g by mouth 2 times daily      OXcarbazepine (TRILEPTAL) 150 MG tablet       traZODone (DESYREL) 50 MG tablet Take 50 mg by mouth nightly      amLODIPine (NORVASC) 5 MG tablet Take 1 tablet by mouth daily 30 tablet 11    metFORMIN (GLUCOPHAGE-XR) 500 MG extended release tablet Take 2 tablets by mouth 2 times daily 120 tablet 1       Allergies:    Adhesive tape and Penicillins    Social History:    reports that she has been smoking. She has a 61.50 pack-year smoking history. She has never used smokeless tobacco. She reports previous alcohol use. Family History:       Problem Relation Age of Onset    Breast Cancer Mother 40    Breast Cancer Maternal Aunt         age unknown       Diet:  No diet orders on file    Review of systems:   Pertinent positives as noted in the HPI. All other systems reviewed and negative. PHYSICAL EXAM:  BP (!) 152/83   Pulse 78   Temp 98.1 °F (36.7 °C) (Oral)   Resp 20   Wt 246 lb (111.6 kg)   SpO2 97%   General appearance: No apparent distress, appears stated age and cooperative. Morbidly obese  HEENT: Normal cephalic, atraumatic without obvious deformity. Pupils equal, round, and reactive to light. Extra ocular muscles intact. Conjunctivae/corneas clear. Neck: Supple, with full range of motion. No jugular venous distention. Trachea midline. Respiratory:  Normal respiratory effort. Clear to auscultation, bilaterally without Rales/Wheezes/Rhonchi. Cardiovascular: Regular rate and rhythm with normal S1/S2 without murmurs, rubs or gallops. Abdomen: Soft, non-tender, non-distended with normal bowel sounds. Musculoskeletal:  No clubbing, cyanosis or edema bilaterally. Skin: Skin color, texture, turgor normal.  No rashes or lesions. Neurologic: Slurred speech, neurovascularly intact without any focal sensory/motor deficits. Cranial nerves: II-XII intact, grossly non-focal.  Psychiatric: Alert and oriented, thought content appropriate, normal insight  Capillary Refill: Brisk,< 3 seconds   Peripheral Pulses: +2 palpable, equal bilaterally     Labs:   Recent Labs     05/21/21  0935   WBC 8.6   HGB 15.8   HCT 47.4*        Recent Labs     05/21/21  0935      K 3.8      CO2 23   BUN 9   CREATININE 0.6   CALCIUM 9.0     No results for input(s): AST, ALT, BILIDIR, BILITOT, ALKPHOS in the last 72 hours. No results for input(s): INR in the last 72 hours. No results for input(s): Daniel Ang in the last 72 hours. Urinalysis:    Lab Results   Component Value Date    NITRU NEGATIVE 05/21/2021    WBCUA 5-9 05/21/2021    BACTERIA MODERATE 05/21/2021    RBCUA 0-2 05/21/2021    BLOODU NEGATIVE 05/21/2021    GLUCOSEU 250 05/21/2021       Radiology:   CTA HEAD W WO CONTRAST   Final Result       1. Slightly attenuated flow in the distal branches of the right middle cerebral artery. Relatively normal flow in the left middle cerebral artery. 2. Relatively normal flow and blood distal vertebral, basilar and posterior cerebral arteries. 3. Relatively normal flow in both internal carotid and anterior cerebral arteries. **This report has been created using voice recognition software. It may contain minor errors which are inherent in voice recognition technology. **      Final report electronically signed by DR Standley Runner on 5/21/2021 12:01 PM      CTA NECK W WO CONTRAST   Final Result       1. Relatively normal flow in the right and left common and internal carotid arteries. 2. Antegrade flow in the right left vertebral arteries. 3. Atherosclerotic calcification of the origin of the left subclavian artery. No significant narrowing at the origin of the great vessels. 4. Small mediastinal lymph nodes. **This report has been created using voice recognition software. It may contain minor errors which are inherent in voice recognition technology. **      Final report electronically signed by DR Shantel Duque on 5/21/2021 12:06 PM      CT HEAD WO CONTRAST (CODE STROKE)   Final Result   No acute intracranial hemorrhage, mass effect or missed midline shift. **This report has been created using voice recognition software. It may contain minor errors which are inherent in voice recognition technology. **      Final report electronically signed by Dr Suzie Arevalo on 5/21/2021 9:18 AM        CTA HEAD W WO CONTRAST    Result Date: 5/21/2021  PROCEDURE: CTA HEAD W WO CONTRAST CLINICAL INFORMATION: cva. COMPARISON: CT scan of the brain dated 21 May 2020. [facility] TECHNIQUE: 1 mm CT axial images were obtained through the head after the fast bolus administration of contrast. A noncontrast localizer was obtained. 3-D reconstructions were performed on a dedicated 3-D workstation. These include multiplanar MPR images and multiplanar MIP images. Isovue intravenous contrast was given. All CT scans at this facility use dose modulation, iterative reconstruction, and/or weight-based dosing when appropriate to reduce radiation dose to as low as reasonably achievable. FINDINGS: Internal carotid arteries: Antegrade flow in the right and left internal carotid arteries. . Middle cerebral arteries: Slightly attenuated flow in the distal branches of the right middle cerebral artery.  Relatively normal flow in the left middle cerebral artery. Anterior cerebral arteries: Antegrade flow in the anterior cerebral arteries bilaterally Vertebral arteries: The vertebral arteries are normal. Basilar artery: Antegrade flow in the basilar artery. Superior cerebellar arteries: Antegrade flow in the right and left superior cerebellar arteries. . Posterior cerebral arteries: Antegrade flow in the posterior cerebral arteries bilaterally. Patent posterior communicating artery on the right side. No aneurysms, stenoses or occlusions are noted. The superior sagittal sinus, vein of Pieter, internal cerebral veins, straight sinus, transverse sinuses and sigmoid sinuses are patent. There are no suspicious findings on the axial source data. 1. Slightly attenuated flow in the distal branches of the right middle cerebral artery. Relatively normal flow in the left middle cerebral artery. 2. Relatively normal flow and blood distal vertebral, basilar and posterior cerebral arteries. 3. Relatively normal flow in both internal carotid and anterior cerebral arteries. **This report has been created using voice recognition software. It may contain minor errors which are inherent in voice recognition technology. ** Final report electronically signed by DR Brenda Vallejo on 5/21/2021 12:01 PM    CT HEAD WO CONTRAST (CODE STROKE)    Result Date: 5/21/2021  PROCEDURE: CT HEAD WO CONTRAST CLINICAL INFORMATION: 68-year-old female with left-sided facial numbness. Code stroke. COMPARISON: None TECHNIQUE: Noncontrast 5 mm axial images were obtained through the brain. All CT scans at this facility use dose modulation, iterative reconstruction, and/or weight-based dosing when appropriate to reduce radiation dose to as low as reasonably achievable. FINDINGS: There is no hemorrhage. There are no intra-or extra-axial collections. There is no hydrocephalus, midline shift or mass effect. The gray-white matter differentiation is preserved.  The paranasal sinuses and mastoid air cells are **This report has been created using voice recognition software. It may contain minor errors which are inherent in voice recognition technology. ** Final report electronically signed by DR Luis Daniel Aguilar on 5/21/2021 12:06 PM        EKG: Normal sinus rhythm    Electronically signed by Eris Mclaughlin MD on 5/21/2021 at 12:43 PM

## 2021-05-21 NOTE — ED NOTES
Bed: 007A  Expected date:   Expected time:   Means of arrival:   Comments:  Pt in room     Leonid Millan RN  05/21/21 4531

## 2021-05-21 NOTE — ED PROVIDER NOTES
[address]       Chief Complaint   Patient presents with    Other     off balance    Numbness     left side of face    Aphasia       Nurses Notes reviewed and I agree except as noted in the HPI. HISTORY OF PRESENT ILLNESS    Daniel Lama is a 44 y.o. female who presents with complaint of left facial numbness, and feeling off balance. Patient states that she feels like she cannot enunciate her words with ease. No visual field deficits. She has no numbness or tingling to arms or legs, no weakness to arms or legs, no facial droop. Patient symptoms came on around 9 PM last night. No trauma history, no history of CVA/TIA. She does not take any blood thinners. Onset: 9 PM last night  Duration:Over 12 hours  Timing: Persistent  Location of Pain: No pain complaints  Intesity/severity: Mild symptoms  Modifying Factors: Not apparent  Relieved by;  Previous Episodes; Tx Before arrival: None  REVIEW OF SYSTEMS      Review of Systems   Constitutional: Negative for fever, chills, diaphoresis and fatigue. HENT: Negative for congestion, drooling, facial swelling and sore throat. Eyes: Negative for photophobia, pain and discharge. Respiratory: Negative for cough, shortness of breath, wheezing and stridor. Cardiovascular: Negative for chest pain, palpitations and leg swelling. Gastrointestinal: Negative for abdominal pain, blood in stool and abdominal distention. Genitourinary: Negative for dysuria, urgency, hematuria and difficulty urinating. Musculoskeletal: Negative for gait problem, neck pain and neck stiffness. Skin; No rash, No itching  Neurological: Positive for left facial numbness. Hematological: Negative for adenopathy. Does not bruise/bleed easily.    PAST MEDICAL HISTORY    has a past medical history of Anxiety, Arthritis, Asthma, Bipolar 1 disorder ([address] CHF (congestive heart failure) ([address] DDD (degenerative disc disease), cervical, Diabetes mellitus ([facility] GERD (gastroesophageal reflux disease), Hyperlipidemia, Hypertension, Pneumonia, PTSD (post-traumatic stress disorder), Sciatica, and Sleep apnea. SURGICAL HISTORY      has a past surgical history that includes Hysterectomy, vaginal; Hysterectomy (); Cholecystectomy; Appendectomy; Ectopic pregnancy surgery;  section; hernia repair; and Colonoscopy.     CURRENT MEDICATIONS       Current Discharge Medication List      CONTINUE these medications which have NOT CHANGED    Details   ibuprofen (ADVIL;MOTRIN) 200 MG tablet Take 600 mg by mouth 2 times daily as needed for Pain      diclofenac (VOLTAREN) 50 MG EC tablet Take 50 mg by mouth 2 times daily as needed for Pain      diphenhydrAMINE (BENADRYL) 50 MG capsule Take 50 mg by mouth every 6 hours as needed for Sleep      loperamide (IMODIUM) 2 MG capsule Take 2 mg by mouth 4 times daily as needed for Diarrhea      buPROPion (WELLBUTRIN XL) 300 MG extended release tablet Take 300 mg by mouth every morning      atorvastatin (LIPITOR) 40 MG tablet TAKE 1 TABLET BY MOUTH EVERY DAY  Qty: 30 tablet, Refills: 1    Associated Diagnoses: Hyperlipidemia, unspecified hyperlipidemia type      lisinopril (PRINIVIL;ZESTRIL) 30 MG tablet TAKE 1 TABLET BY MOUTH EVERY DAY  Qty: 30 tablet, Refills: 1    Associated Diagnoses: Essential hypertension      atenolol (TENORMIN) 50 MG tablet Take 1 tablet by mouth 2 times daily  Qty: 60 tablet, Refills: 1    Associated Diagnoses: Essential hypertension      cyclobenzaprine (FLEXERIL) 10 MG tablet Take 1 tablet by mouth 3 times daily as needed for Muscle spasms  Qty: 90 tablet, Refills: 2      glimepiride (AMARYL) 2 MG tablet Take 1 tablet by mouth every morning  Qty: 90 tablet, Refills: 1    Associated Diagnoses: Uncontrolled type 2 diabetes mellitus with hyperglycemia (HCC)      insulin aspart (NOVOLOG FLEXPEN) 100 UNIT/ML injection pen Inject 12 Units into the skin 3 times daily (before meals) Inject 12 units before breakfast and lunch and 24 units before dinner. Qty: 5 pen, Refills: 3      benzonatate (TESSALON) 100 MG capsule Take 1-2 capsules by mouth 3 times daily as needed for Cough  Qty: 60 capsule, Refills: 2      clonazePAM (KLONOPIN) 0.5 MG tablet Take 0.5 mg by mouth 2 times daily as needed. dicyclomine (BENTYL) 20 MG tablet Take 20 mg by mouth 3 times daily as needed      doxepin (SINEQUAN) 10 MG capsule Take 20 mg by mouth nightly       Melatonin 10 MG TABS Take 10 mg by mouth nightly as needed      OXcarbazepine (TRILEPTAL) 150 MG tablet Take 150 mg by mouth 2 times daily       amLODIPine (NORVASC) 5 MG tablet Take 1 tablet by mouth daily  Qty: 30 tablet, Refills: 11    Associated Diagnoses: Essential hypertension      metFORMIN (GLUCOPHAGE-XR) 500 MG extended release tablet Take 2 tablets by mouth 2 times daily  Qty: 120 tablet, Refills: 1    Associated Diagnoses: Uncontrolled type 2 diabetes mellitus with hyperglycemia (HCC)      Handicap Placard MISC by Does not apply route Valid for 5 years. Qty: 1 each, Refills: 0    Associated Diagnoses: DDD (degenerative disc disease), lumbar; Chronic obstructive pulmonary disease, unspecified COPD type ([facility] Chronic pulmonary heart disease (HCC)             ALLERGIES     is allergic to adhesive tape and penicillins. FAMILY HISTORY     She indicated that her mother is . She indicated that her father is alive. She indicated that the status of her maternal aunt is unknown.   family history includes Breast Cancer in her maternal aunt; Breast Cancer (age of onset: 40) in her mother; Heart Disease in her father; Other in her father. SOCIAL HISTORY      reports that she has been smoking cigarettes. She has a 61.50 pack-year smoking history. She has never used smokeless tobacco. She reports previous alcohol use. She reports previous drug use. PHYSICAL EXAM     INITIAL VITALS:  height is 5' 4\" (1.626 m) and weight is 246 lb (111.6 kg).  Her oral temperature is 98.5 °F (36.9 °C). Her blood pressure is 165/89 (abnormal) and her pulse is 78. Her respiration is 16 and oxygen saturation is 97%. Physical Exam   Constitutional:  well-developed and well-nourished. HENT: Head: Normocephalic, atraumatic, Bilateral external ears normal, Oropharynx mosit, No oral exudates, Nose normal.   Eyes: PERRL, EOMI, Conjunctiva normal, No discharge. No scleral icterus  Neck: Normal range of motion, No tenderness, Supple  Lympatics: No lymphadenopathy. Cardiovascular: Normal rate, regular rhythm, S1 normal and S2 normal.  Exam reveals no gallop. Pulmonary/Chest: Effort normal and breath sounds normal. No accessory muscle usage or stridor. No respiratory distress. no wheezes. has no rales. exhibits no tenderness. Abdominal: Soft. Bowel sounds are normal.  exhibits no distension. There is no tenderness. There is no rebound and no guarding. Genitourinary:   Extremities: No edema, no tenderness, no cyanosis, no clubbing. Musculoskeletal: Good range of motion in major joints is observed. No major deformities noted. Neurological: Alert and oriented ×3, normal motor function, left facial numbness, no focal deficits. GCS 15. Stroke score of 1. Skin: Skin is warm, dry and intact. No rash noted. No erythema. Psychiatric: Affect normal, judgment normal, mood normal.  DIFFERENTIAL DIAGNOSIS:       DIAGNOSTIC RESULTS     EKG: All EKG's are interpreted by the Emergency Department Physician who either signs or Co-signs this chart in the absence of a cardiologist.      RADIOLOGY: non-plain film images(s) such as CT, Ultrasound and MRI are read by the radiologist.  Plain radiographic images are visualized and preliminarily interpreted by the emergency physician unless otherwise stated below.       LABS:   Labs Reviewed   CBC WITH AUTO DIFFERENTIAL - Abnormal; Notable for the following components:       Result Value    Hematocrit 47.4 (*)     MPV 8.9 (*)     All other components within normal limits   BASIC METABOLIC PANEL W/ REFLEX TO MG FOR LOW K - Abnormal; Notable for the following components:    Glucose 134 (*)     All other components within normal limits   URINE WITH REFLEXED MICRO - Abnormal; Notable for the following components:    Glucose, Ur 250 (*)     Protein, UA TRACE (*)     Leukocyte Esterase, Urine TRACE (*)     All other components within normal limits   OSMOLALITY - Abnormal; Notable for the following components:    Osmolality Calc 272.6 (*)     All other components within normal limits   ANION GAP   GLOMERULAR FILTRATION RATE, ESTIMATED       EMERGENCY DEPARTMENT COURSE:   Vitals:    Vitals:    05/21/21 1240 05/21/21 1409 05/21/21 1513 05/21/21 1545   BP:  (!) 164/72 (!) 165/89    Pulse: 78 82 78    Resp: 20 14 16    Temp:   98.5 °F (36.9 °C)    TempSrc:   Oral    SpO2: 97% 95% 97%    Weight:       Height:    5' 4\" (1.626 m)     Presented with complaint of left facial numbness, aphasia that is not apparent on exam.  Patient able to get up without any ataxia, she is not leaning to left or right as reported during intake. Negative Romberg test.  CT head negative. Case discussed with neurology, will obtain CT head neck. Currently, patient gets a stroke score of 1. CRITICAL CARE:       CONSULTS:  None    PROCEDURES:  None    FINAL IMPRESSION      1. Neurological abnormality          DISPOSITION/PLAN   Admitted    PATIENT REFERRED TO:  No follow-up provider specified.     DISCHARGE MEDICATIONS:  Current Discharge Medication List          (Please note that portions of this note were completed with a voice recognition program.  Efforts were made to edit the dictations but occasionally words are mis-transcribed.)    Micha Black, DO Micha Black DO  05/21/21 5455

## 2021-05-21 NOTE — ED NOTES
Patient is tearful, and explaining to nurse about being very stressed lately and going through a divorce after 32 years of marriage.       Rachael Hartmann RN  05/21/21 6746

## 2021-05-21 NOTE — VIRTUAL HEALTH
Consults  Patient Location:  [address] [place] [address] EMERGENCY DEPT    Provider Location (City/State): [place]    This virtual visit was conducted via interactive/real-time audio/video. [facility] Stroke and Vascular Neurology Consult for  [facility] Stroke Alert through [address] @ 09:37am  5/21/2021 10:04 AM  Pt Name: Raghu Nielson  MRN: [id number]  YOB: 1970  Date of evaluation: 5/21/2021  Primary Care Physician: NAIF Pollack CNP  Reason for Evaluation: Stroke evaluation with Phone Consult, Discussion and Review of imaging    Raghu Nielson is a 44 y.o. female who presents with left face numbness. lkw 5/20 2100. No headache,no other sensory,motor,visual or bulbar complaints. LKW: 2100 05/20  NIH:  1         Glucose 140s  Cr. Pending    Other co-morbidities include:DM,HLD,Vit. D deficiency,HTN,bipolar affective disorder,PTSD,Heart failure with preserved EF,COPD,Sleep apnea,smoking     Not on any AC/AP at home  On lipitor 40 mg    Allergies  is allergic to adhesive tape and penicillins. Medications  Prior to Admission medications    Medication Sig Start Date End Date Taking? Authorizing Provider   Handicap Placard MISC by Does not apply route Valid for 5 years.  5/19/21   NAIF Curran CNP   atorvastatin (LIPITOR) 40 MG tablet TAKE 1 TABLET BY MOUTH EVERY DAY 5/6/21   NAIF Curran CNP   lisinopril (PRINIVIL;ZESTRIL) 30 MG tablet TAKE 1 TABLET BY MOUTH EVERY DAY 5/6/21   NAIF Curran CNP   atenolol (TENORMIN) 50 MG tablet Take 1 tablet by mouth 2 times daily 5/6/21   NAIF Curran CNP   cyclobenzaprine (FLEXERIL) 10 MG tablet Take 1 tablet by mouth 3 times daily as needed for Muscle spasms 4/27/21   NAIF Curran CNP   glimepiride (AMARYL) 2 MG tablet Take 1 tablet by mouth every morning 4/26/21   NAIF Curran CNP   insulin aspart (NOVOLOG FLEXPEN) 100 UNIT/ML injection pen activity: Not on file   Other Topics Concern    Not on file   Social History Narrative    Not on file     Social Determinants of Health     Financial Resource Strain: Low Risk     Difficulty of Paying Living Expenses: Not hard at all   Food Insecurity: No Food Insecurity    Worried About Running Out of Food in the Last Year: Never true    [address] in the Last Year: Never true   Transportation Needs: No Transportation Needs    Lack of Transportation (Medical): No    Lack of Transportation (Non-Medical): No   Physical Activity:     Days of Exercise per Week:     Minutes of Exercise per Session:    Stress:     Feeling of Stress :    Social Connections:     Frequency of Communication with Friends and Family:     Frequency of Social Gatherings with Friends and Family:     Attends [employer] Services:     Active Member of Clubs or Organizations:     Attends Club or Organization Meetings:     Marital Status:    Intimate Partner Violence:     Fear of Current or Ex-Partner:     Emotionally Abused:     Physically Abused:     Sexually Abused:      Family History      Problem Relation Age of Onset    Breast Cancer Mother 40    Breast Cancer Maternal Aunt         age unknown       OBJECTIVE  BP (!) 166/83   Pulse 74   Temp 98.1 °F (36.7 °C) (Oral)   Resp 20   Wt 246 lb (111.6 kg)   SpO2 97%     NIH Stroke Scale  Interval: Baseline  Level of Consciousness (1a. ): Alert  LOC Questions (1b. ):  Answers both correctly  LOC Commands (1c. ): Performs both tasks correctly  Best Gaze (2. ): Normal  Visual (3. ): No visual loss  Facial Palsy (4. ): Normal symmetrical movement  Motor Arm, Left (5a. ): No drift  Motor Arm, Right (5b. ): No drift  Motor Leg, Left (6a. ): No drift  Motor Leg, Right (6b. ): No drift  Limb Ataxia (7. ): Absent  Best Language (9. ): Mild to moderate aphasia  Dysarthria (10. ): Normal  Extinction and Inattention (11): No abnormality  Pre-Morbid mRS: 0    Imaging:  Images were This is a Phone Consult, I have not seen the patient face to face, the telemedicine device was not utilized.     Angeli Martinez MD, MD   Stroke, Neurocritical Care And/or [address] [place] [address] Stroke [id number]  Electronically signed 5/21/2021 at 10:04 AM

## 2021-05-21 NOTE — ED NOTES
External catheter applied with assistance from another ED nurse. Patient able to assist staff in removing under garments and external catheter. Patient resting in cot with call light in reach. Will continue to monitor. VSS.            Isabella Hartmann RN  05/21/21 9976

## 2021-05-21 NOTE — ED NOTES
Patient able to stand up at side of bed under the direction of Dr. Trudi Moyer at bedside. Patient wheeled to restroom to obtain urine sample.       Odessa Hartmann RN  05/21/21 0193

## 2021-05-21 NOTE — FLOWSHEET NOTE
Advance Care Planning     Advance Care Planning Inpatient Note  Spiritual Care Department    Today's Date: 5/21/2021  Unit: [facility]    Received request from patient. Upon review of chart and communication with care team, patient's decision making abilities are not in question. Patientwas/were present in the room during visit. Goals of ACP Conversation:  Discuss advance care planning documents  Facilitate a discussion related to patient's goals of care as they align with the patient's values and beliefs. Health Care Decision Makers:      Primary Decision Maker: Debi Alvarez - Child - [phone]  Click here to [address] including selection of the Healthcare Decision Maker Relationship (ie \"Primary\")     Today we:  Updated [address] (Patient Wishes):  Healthcare Power of /Advance Directive Appointment of Health Care Agent     Assessment:  Sanjana is a 45 yo mother who is going through a divorce after 22 years of marriage. She wanted to execute a POA naming her daughter as agent. She spent some time discussing the brutal stresses of the divorce situation and the changes in her  that led to it. Once divorce is final, she plans to move to [place] to live her her dtr and [place]  Mane Vianey sates she is an atheist but also admits vocalizing prayer in the form of \"I Hope\" statements      Interventions:  Provided education on documents for clarity and greater understanding. Discussed and provided education on state decision maker hierarchy. Assisted in the completion of documents according to patient's wishes at this time. Encouraged ongoing ACP conversation with future decision makers and loved ones. Returned original document(s) to patient, as well as copies for distribution to appointed agents.    Dicussed the marriage situation as a type of grief and encouraged her to reframe her experience in those terms ( as she stated her  \"isn't the man I . \" ) allowing her to work through stages as in grief. Introduced OP spiritual care and provided a brochure for ongoing conversation as patient only anticipates staying the night. Outcomes/Plan:  New advance directive completed. Original advance directive form and copies for agent(s) given to patient. Teach Back Method used to verify the patient's and/or Healthcare Decision Maker's understanding of key information in the advance directive documents   scanned doc to medical records and placed copy in paper chart.      Electronically signed by Aye Baker on 5/21/2021 at 5:17 PM

## 2021-05-22 ENCOUNTER — APPOINTMENT (OUTPATIENT)
Dept: CT IMAGING | Age: 51
DRG: 065 | End: 2021-05-22
Payer: MEDICARE

## 2021-05-22 VITALS
WEIGHT: 246 LBS | HEIGHT: 64 IN | TEMPERATURE: 97.6 F | HEART RATE: 81 BPM | DIASTOLIC BLOOD PRESSURE: 90 MMHG | OXYGEN SATURATION: 90 % | SYSTOLIC BLOOD PRESSURE: 189 MMHG | RESPIRATION RATE: 16 BRPM | BODY MASS INDEX: 42 KG/M2

## 2021-05-22 LAB
ALBUMIN SERPL-MCNC: 3.6 G/DL (ref 3.5–5.1)
ALP BLD-CCNC: 66 U/L (ref 38–126)
ALT SERPL-CCNC: 15 U/L (ref 11–66)
ANION GAP SERPL CALCULATED.3IONS-SCNC: 13 MEQ/L (ref 8–16)
AST SERPL-CCNC: 14 U/L (ref 5–40)
BILIRUB SERPL-MCNC: 0.5 MG/DL (ref 0.3–1.2)
BUN BLDV-MCNC: 7 MG/DL (ref 7–22)
CALCIUM SERPL-MCNC: 8.8 MG/DL (ref 8.5–10.5)
CHLORIDE BLD-SCNC: 102 MEQ/L (ref 98–111)
CO2: 23 MEQ/L (ref 23–33)
CREAT SERPL-MCNC: 0.6 MG/DL (ref 0.4–1.2)
ERYTHROCYTE [DISTWIDTH] IN BLOOD BY AUTOMATED COUNT: 13.3 % (ref 11.5–14.5)
ERYTHROCYTE [DISTWIDTH] IN BLOOD BY AUTOMATED COUNT: 45.3 FL (ref 35–45)
GFR SERPL CREATININE-BSD FRML MDRD: > 90 ML/MIN/1.73M2
GLUCOSE BLD-MCNC: 129 MG/DL (ref 70–108)
GLUCOSE BLD-MCNC: 141 MG/DL (ref 70–108)
HCT VFR BLD CALC: 48.2 % (ref 37–47)
HEMOGLOBIN: 15.6 GM/DL (ref 12–16)
MAGNESIUM: 1.9 MG/DL (ref 1.6–2.4)
MCH RBC QN AUTO: 30.1 PG (ref 26–33)
MCHC RBC AUTO-ENTMCNC: 32.4 GM/DL (ref 32.2–35.5)
MCV RBC AUTO: 93.1 FL (ref 81–99)
PLATELET # BLD: 201 THOU/MM3 (ref 130–400)
PMV BLD AUTO: 8.7 FL (ref 9.4–12.4)
POTASSIUM REFLEX MAGNESIUM: 3.5 MEQ/L (ref 3.5–5.2)
RBC # BLD: 5.18 MILL/MM3 (ref 4.2–5.4)
SODIUM BLD-SCNC: 138 MEQ/L (ref 135–145)
TOTAL PROTEIN: 6.5 G/DL (ref 6.1–8)
WBC # BLD: 7.1 THOU/MM3 (ref 4.8–10.8)

## 2021-05-22 PROCEDURE — 99232 SBSQ HOSP IP/OBS MODERATE 35: CPT | Performed by: INTERNAL MEDICINE

## 2021-05-22 PROCEDURE — 36415 COLL VENOUS BLD VENIPUNCTURE: CPT

## 2021-05-22 PROCEDURE — 70450 CT HEAD/BRAIN W/O DYE: CPT

## 2021-05-22 PROCEDURE — 85027 COMPLETE CBC AUTOMATED: CPT

## 2021-05-22 PROCEDURE — 99233 SBSQ HOSP IP/OBS HIGH 50: CPT | Performed by: PSYCHIATRY & NEUROLOGY

## 2021-05-22 PROCEDURE — 2580000003 HC RX 258: Performed by: FAMILY MEDICINE

## 2021-05-22 PROCEDURE — 82948 REAGENT STRIP/BLOOD GLUCOSE: CPT

## 2021-05-22 PROCEDURE — 83735 ASSAY OF MAGNESIUM: CPT

## 2021-05-22 PROCEDURE — 80053 COMPREHEN METABOLIC PANEL: CPT

## 2021-05-22 PROCEDURE — 6370000000 HC RX 637 (ALT 250 FOR IP): Performed by: FAMILY MEDICINE

## 2021-05-22 RX ORDER — CLOPIDOGREL BISULFATE 75 MG/1
75 TABLET ORAL DAILY
Qty: 21 TABLET | Refills: 0 | Status: SHIPPED | OUTPATIENT
Start: 2021-05-22 | End: 2021-06-12

## 2021-05-22 RX ORDER — ASPIRIN 81 MG/1
81 TABLET, CHEWABLE ORAL DAILY
Qty: 30 TABLET | Refills: 3 | Status: SHIPPED | OUTPATIENT
Start: 2021-05-23

## 2021-05-22 RX ORDER — NICOTINE 21 MG/24HR
1 PATCH, TRANSDERMAL 24 HOURS TRANSDERMAL DAILY
Qty: 30 PATCH | Refills: 3 | Status: SHIPPED | OUTPATIENT
Start: 2021-05-23

## 2021-05-22 RX ADMIN — OXCARBAZEPINE 150 MG: 300 TABLET, FILM COATED ORAL at 10:09

## 2021-05-22 RX ADMIN — ACETAMINOPHEN 650 MG: 325 TABLET ORAL at 03:21

## 2021-05-22 RX ADMIN — ACETAMINOPHEN 650 MG: 325 TABLET ORAL at 10:10

## 2021-05-22 RX ADMIN — SODIUM CHLORIDE, PRESERVATIVE FREE 10 ML: 5 INJECTION INTRAVENOUS at 10:13

## 2021-05-22 RX ADMIN — CLONAZEPAM 0.5 MG: 0.5 TABLET ORAL at 10:18

## 2021-05-22 RX ADMIN — AMLODIPINE BESYLATE 2.5 MG: 5 TABLET ORAL at 10:09

## 2021-05-22 RX ADMIN — BUPROPION HYDROCHLORIDE 300 MG: 300 TABLET, EXTENDED RELEASE ORAL at 10:10

## 2021-05-22 RX ADMIN — LISINOPRIL 5 MG: 5 TABLET ORAL at 10:10

## 2021-05-22 RX ADMIN — ASPIRIN 81 MG: 81 TABLET, CHEWABLE ORAL at 10:10

## 2021-05-22 ASSESSMENT — PAIN DESCRIPTION - PAIN TYPE: TYPE: CHRONIC PAIN

## 2021-05-22 ASSESSMENT — PAIN DESCRIPTION - FREQUENCY
FREQUENCY: CONTINUOUS
FREQUENCY: CONTINUOUS

## 2021-05-22 ASSESSMENT — PAIN DESCRIPTION - DESCRIPTORS
DESCRIPTORS: ACHING
DESCRIPTORS: ACHING

## 2021-05-22 ASSESSMENT — PAIN DESCRIPTION - LOCATION: LOCATION: BACK

## 2021-05-22 ASSESSMENT — PAIN SCALES - GENERAL
PAINLEVEL_OUTOF10: 3
PAINLEVEL_OUTOF10: 7
PAINLEVEL_OUTOF10: 3

## 2021-05-22 ASSESSMENT — PAIN - FUNCTIONAL ASSESSMENT
PAIN_FUNCTIONAL_ASSESSMENT: PREVENTS OR INTERFERES SOME ACTIVE ACTIVITIES AND ADLS
PAIN_FUNCTIONAL_ASSESSMENT: PREVENTS OR INTERFERES SOME ACTIVE ACTIVITIES AND ADLS

## 2021-05-22 ASSESSMENT — PAIN DESCRIPTION - ONSET
ONSET: ON-GOING
ONSET: ON-GOING

## 2021-05-22 ASSESSMENT — PAIN DESCRIPTION - PROGRESSION: CLINICAL_PROGRESSION: GRADUALLY WORSENING

## 2021-05-22 ASSESSMENT — PAIN DESCRIPTION - ORIENTATION: ORIENTATION: LOWER

## 2021-05-22 NOTE — PROGRESS NOTES
Hospitalist      Patient:  Gabby Mckeon    Unit/Bed:4A-14/014-A  YOB: 1970  MRN: [id number]   Acct: [de-identified]     PCP: NAIF Jeff CNP  Date of Admission: 2021        Assessment and Plan:        1. Acute CVA by clinical diagnosis: Unable to obtain MRI secondary to incompatible hardware. We will continue with aspirin and Plavix. 2. Type 2 diabetes uncontrolled discussed at length modifying dietary habits or medications the patient. 3. Essential hypertension we will allow permissive hypertension at this time we will resume home blood pressure medicine the patient is at time of discharge  4. Dyslipidemia: We will continue with statin therapy  5. Nicotine dependence counseling given  2021 addendum patient is completely independent at this time does not need pt/ot eval, neuro recommends to dc home today    CC: Facial droop    HPI: 53-year-old female with loss of balance and slurred speech with facial droop presents to [facility] for further evaluation. Patient has a past medical history of asthma, CHF, diabetes mellitus, GERD, hypertension, hyperlipidemia, bipolar disorder and sleep apnea    ROS (14 point review of systems completed. Pertinent positives noted. Otherwise ROS is negative) :  No complaints today  PMH:  Per HPI and       Diagnosis Date    Anxiety     Arthritis     Asthma     Bipolar 1 disorder ([address]     CHF (congestive heart failure) ([address]     DDD (degenerative disc disease), cervical     Diabetes mellitus ([address]     GERD (gastroesophageal reflux disease)     Hyperlipidemia     Hypertension     Pneumonia     PTSD (post-traumatic stress disorder)     Sciatica     right    Sleep apnea      SHX:        Procedure Laterality Date    APPENDECTOMY       SECTION      2    CHOLECYSTECTOMY      COLONOSCOPY      ECTOPIC PREGNANCY SURGERY      HERNIA REPAIR      abd    HYSTERECTOMY  2006    HYSTERECTOMY, VAGINAL       FHX: Problem Relation Age of Onset    Breast Cancer Mother 40    Breast Cancer Maternal Aunt         age unknown    Heart Disease Father     Other Father         pacemaker & stent     SOCHX:   Social History     Socioeconomic History    Marital status: Legally      Spouse name: Not on file    Number of children: 2    Years of education: Not on file    Highest education level: Not on file   Occupational History    Not on file   Tobacco Use    Smoking status: Current Every Day Smoker     Packs/day: 1.50     Years: 41.00     Pack years: 61.50     Types: Cigarettes    Smokeless tobacco: Never Used   Vaping Use    Vaping Use: Never used   Substance and Sexual Activity    Alcohol use: Not Currently    Drug use: Not Currently    Sexual activity: Not on file   Other Topics Concern    Not on file   Social History Narrative    Not on file     Social Determinants of Health     Financial Resource Strain: Low Risk     Difficulty of Paying Living Expenses: Not hard at all   Food Insecurity: No Food Insecurity    Worried About Running Out of Food in the Last Year: Never true    Meli of Food in the Last Year: Never true   Transportation Needs: No Transportation Needs    Lack of Transportation (Medical): No    Lack of Transportation (Non-Medical): No   Physical Activity:     Days of Exercise per Week:     Minutes of Exercise per Session:    Stress:     Feeling of Stress :    Social Connections:     Frequency of Communication with Friends and Family:     Frequency of Social Gatherings with Friends and Family:     Attends [employer] Services:     Active Member of Clubs or Organizations:     Attends Club or Organization Meetings:     Marital Status:    Intimate Partner Violence:     Fear of Current or Ex-Partner:     Emotionally Abused:     Physically Abused:     Sexually Abused:        Allergies: Adhesive tape and Penicillins  Medications:     sodium chloride      sodium chloride 50 mL/hr at 05/21/21 1747    dextrose        sodium chloride flush  10 mL Intravenous 2 times per day    enoxaparin  40 mg Subcutaneous Q24H    atorvastatin  40 mg Oral Nightly    insulin lispro  0-12 Units Subcutaneous TID WC    insulin lispro  0-6 Units Subcutaneous Nightly    aspirin  81 mg Oral Daily    amLODIPine  2.5 mg Oral Daily    lisinopril  5 mg Oral Daily    nicotine  1 patch Transdermal Daily    buPROPion  300 mg Oral Daily    OXcarbazepine  150 mg Oral BID     Prior to Admission medications    Medication Sig Start Date End Date Taking?  Authorizing Provider   ibuprofen (ADVIL;MOTRIN) 200 MG tablet Take 600 mg by mouth 2 times daily as needed for Pain   Yes Historical Provider, MD   diclofenac (VOLTAREN) 50 MG EC tablet Take 50 mg by mouth 2 times daily as needed for Pain   Yes Historical Provider, MD   diphenhydrAMINE (BENADRYL) 50 MG capsule Take 50 mg by mouth every 6 hours as needed for Sleep   Yes Historical Provider, MD   loperamide (IMODIUM) 2 MG capsule Take 2 mg by mouth 4 times daily as needed for Diarrhea   Yes Historical Provider, MD   buPROPion (WELLBUTRIN XL) 300 MG extended release tablet Take 300 mg by mouth every morning   Yes Historical Provider, MD   Insulin Degludec (TRESIBA) 100 UNIT/ML SOLN Inject 55 Units into the skin nightly   Yes Historical Provider, MD   b complex vitamins capsule Take 1 capsule by mouth daily   Yes Historical Provider, MD   VITAMIN D, CHOLECALCIFEROL, PO Take 1 tablet by mouth daily   Yes Historical Provider, MD   atorvastatin (LIPITOR) 40 MG tablet TAKE 1 TABLET BY MOUTH EVERY DAY 5/6/21  Yes NAIF Michel CNP   lisinopril (PRINIVIL;ZESTRIL) 30 MG tablet TAKE 1 TABLET BY MOUTH EVERY DAY 5/6/21  Yes NAIF Michel - CNP   atenolol (TENORMIN) 50 MG tablet Take 1 tablet by mouth 2 times daily 5/6/21  Yes NAIF Michel CNP   cyclobenzaprine (FLEXERIL) 10 MG tablet Take 1 tablet by mouth 3 times daily as needed for Muscle spasms 4/27/21  Yes Ethnicity   Account #       [de-identified]      Room Number          0014   Accession       [id number]     Date of Study        05/21/2021  Number   Date of Birth   1970     Referring Physician  Cinthia Petit MD   Age             44 year(s)     Sonographer          Yoko Rodriguez, [facility]                                  Interpreting         Echo reader of the                                 Physician            dina Briscoe MD  Procedure Type of Study   TTE procedure:ECHOCARDIOGRAM COMPLETE 2D W DOPPLER W COLOR. Procedure Date Date: 05/21/2021 Start: 03:40 PM Study Location: Bedside Technical Quality: Poor visualization due to body habitus. Indications:Rule out structural heart disease due to symptoms. Additional Medical History:Smoker, Hypertension, Diabetic, Morbid Obesity, COPD. Patient Status: Routine Height: 64 inches Weight: 246.01 pounds BSA: 2.14 m^2 BMI: 42.23 kg/m^2 BP: 165/89 mmHg  Conclusions   Summary  Ejection fraction is visually estimated at 60%. Overall left ventricular function is normal.   Signature   ----------------------------------------------------------------  Electronically signed by Enoch Briscoe MD (Interpreting  physician) on 05/21/2021 at 07:07 PM  ----------------------------------------------------------------   Findings   Mitral Valve  The mitral valve structure was normal with normal leaflet separation. DOPPLER: The transmitral velocity was within the normal range with no  evidence for mitral stenosis. There was no evidence of mitral  regurgitation. Aortic Valve  The aortic valve was trileaflet with normal thickness and cuspal  separation. DOPPLER: Transaortic velocity was within the normal range with  no evidence of aortic stenosis. There was no evidence of aortic  regurgitation. Tricuspid Valve  Trivial tricuspid regurgitation visualized.    Pulmonic Valve  The pulmonic valve was not well IVRT: 77 msec           cm/s   MV E' Septal Velocity: 6.5                         TV Peak Gradient: 0.56  cm/s                       AV DVI (Vmax):0.62      mmHg  MV A' Septal Velocity: 8.8  cm/s                                               PV Peak Velocity: 70.7  MV E' Lateral Velocity:                            cm/s  8.5 cm/s                                           PV Peak Gradient: 2  MV A' Lateral Velocity:                            mmHg  9.7 cm/s  E/E' septal: 11.54  E/E' lateral: 8.82                                 [place] ED Velocity: 106  MR Velocity: 519 cm/s                              cm/s  [email or web address] [id number]    CTA HEAD W WO CONTRAST    Result Date: 5/21/2021  PROCEDURE: CTA HEAD W WO CONTRAST CLINICAL INFORMATION: cva. COMPARISON: CT scan of the brain dated 21 May 2020. Delmon Green TECHNIQUE: 1 mm CT axial images were obtained through the head after the fast bolus administration of contrast. A noncontrast localizer was obtained. 3-D reconstructions were performed on a dedicated 3-D workstation. These include multiplanar MPR images and multiplanar MIP images. Isovue intravenous contrast was given. All CT scans at this facility use dose modulation, iterative reconstruction, and/or weight-based dosing when appropriate to reduce radiation dose to as low as reasonably achievable. FINDINGS: Internal carotid arteries: Antegrade flow in the right and left internal carotid arteries. . Middle cerebral arteries: Slightly attenuated flow in the distal branches of the right middle cerebral artery. Relatively normal flow in the left middle cerebral artery. Anterior cerebral arteries: Antegrade flow in the anterior cerebral arteries bilaterally Vertebral arteries: The vertebral arteries are normal. Basilar artery: Antegrade flow in the basilar artery.  Superior cerebellar arteries: Antegrade flow in the right and left superior cerebellar arteries. . Posterior cerebral arteries: Antegrade flow in the posterior cerebral arteries bilaterally. Patent posterior communicating artery on the right side. No aneurysms, stenoses or occlusions are noted. The superior sagittal sinus, vein of Pieter, internal cerebral veins, straight sinus, transverse sinuses and sigmoid sinuses are patent. There are no suspicious findings on the axial source data. 1. Slightly attenuated flow in the distal branches of the right middle cerebral artery. Relatively normal flow in the left middle cerebral artery. 2. Relatively normal flow and blood distal vertebral, basilar and posterior cerebral arteries. 3. Relatively normal flow in both internal carotid and anterior cerebral arteries. **This report has been created using voice recognition software. It may contain minor errors which are inherent in voice recognition technology. ** Final report electronically signed by DR Elizabeth Rizzo on 5/21/2021 12:01 PM    CT HEAD WO CONTRAST (CODE STROKE)    Result Date: 5/21/2021  PROCEDURE: CT HEAD WO CONTRAST CLINICAL INFORMATION: 56-year-old female with left-sided facial numbness. Code stroke. COMPARISON: None TECHNIQUE: Noncontrast 5 mm axial images were obtained through the brain. All CT scans at this facility use dose modulation, iterative reconstruction, and/or weight-based dosing when appropriate to reduce radiation dose to as low as reasonably achievable. FINDINGS: There is no hemorrhage. There are no intra-or extra-axial collections. There is no hydrocephalus, midline shift or mass effect. The gray-white matter differentiation is preserved. The paranasal sinuses and mastoid air cells are normally aerated. There is no suspicious calvarial abnormality. No acute intracranial hemorrhage, mass effect or missed midline shift. **This report has been created using voice recognition software.  It may contain minor errors which are inherent in voice recognition technology. ** Final report electronically signed by Dr Marcie Flynn on 5/21/2021 9:18 AM    CTA NECK W WO CONTRAST    Result Date: 5/21/2021  PROCEDURE: CTA NECK W WO CONTRAST CLINICAL INFORMATION: cva. COMPARISON: No prior study. TECHNIQUE: 1 mm axial images were obtained through the neck after the fast bolus administration of contrast. A noncontrast localizer was obtained. 3-D reconstructions were performed on a dedicated 3-D workstation. These include multiplanar MPR images, multiplanar MIP images and centerline reconstructions. Isovue intravenous contrast was given. All CT scans at this facility use dose modulation, iterative reconstruction, and/or weight-based dosing when appropriate to reduce radiation dose to as low as reasonably achievable. FINDINGS: Aortic arch and branches: Atherosclerotic calcification at the origin of the left subclavian artery. Right common carotid artery/ICA: Normal flow in the right common and internal carotid arteries. Left common carotid artery/ICA: Normal flow in the left common and internal carotid arteries. Vertebral arteries: Antegrade flow in the right and left vertebral arteries. Intracranial cerebral circulation: Grossly unremarkable. Axial source data: Small mediastinal lymph nodes. Mild prominence of the right and left pulmonary arteries. Straightening of the normal cervical lordosis. 1. Relatively normal flow in the right and left common and internal carotid arteries. 2. Antegrade flow in the right left vertebral arteries. 3. Atherosclerotic calcification of the origin of the left subclavian artery. No significant narrowing at the origin of the great vessels. 4. Small mediastinal lymph nodes. **This report has been created using voice recognition software. It may contain minor errors which are inherent in voice recognition technology. ** Final report electronically signed by DR Kellie Sood on 5/21/2021 12:06 PM    [facility] nodes are seen in the right axilla. One of these is oval and very similar in appearance to the mammographic finding. This likely corresponds to the mammographic finding. There are no correlates in the upper central right breast. There are no  suspicious findings. No sonographic evidence of malignancy. A one year screening mammogram is recommended. A result letter will be sent to the patient. She will also receive a reminder 1 month prior to her next mammogram. BI-RADS CATEGORY 2: BENIGN FINDINGS. Management Recommendation: Routine annual mammography. **This report has been created using voice recognition software. It may contain minor errors which are inherent in voice recognition technology. ** Final report electronically signed by Dr. Chris Chaudhari on 5/18/2021 2:55 PM      Electronically signed by Tamica Rodarte DO on 5/22/2021 at 10:06 AM

## 2021-05-22 NOTE — CONSULTS
NEUROLOGY CONSULT NOTE      Requesting Physician: Idalmis Drew MD    Reason for Consult:  Evaluate for stroke like symptoms    History of Present Illness:  Yang Hazel is a 44 y.o. female admitted to [facility] on 2021.       44year old woman with obesity, DM, HTN, smoker, back pain with spine stimulator that is not MRI compatible, since yesterday about 9pm c/o left face droop, numbness and not able to walk steadily, she felt wobbly, her symptoms improve since she came in but she still c/o left facial droop. Reports no chest pain. No shortness of breath with exertion. Reports no neck pain. No vision changes. No dysphagia. No fever. No rash. No weight loss. Past Medical History:        Diagnosis Date    Anxiety     Arthritis     Asthma     Bipolar 1 disorder ([facility] [address]     CHF (congestive heart failure) (HCC)     DDD (degenerative disc disease), cervical     Diabetes mellitus ([facility] [address]     GERD (gastroesophageal reflux disease)     Hyperlipidemia     Hypertension     Pneumonia     PTSD (post-traumatic stress disorder)     Sciatica     right    Sleep apnea            Procedure Laterality Date    APPENDECTOMY       SECTION      2    CHOLECYSTECTOMY      COLONOSCOPY      ECTOPIC PREGNANCY SURGERY      HERNIA REPAIR      abd    HYSTERECTOMY  2006    HYSTERECTOMY, VAGINAL         Allergies:     Allergies   Allergen Reactions    Adhesive Tape     Penicillins         Current Medications:   sodium chloride flush 0.9 % injection 10 mL, 2 times per day  sodium chloride flush 0.9 % injection 10 mL, PRN  0.9 % sodium chloride infusion, PRN  enoxaparin (LOVENOX) injection 40 mg, Q24H  promethazine (PHENERGAN) tablet 12.5 mg, Q6H PRN   Or  ondansetron (ZOFRAN) injection 4 mg, Q6H PRN  polyethylene glycol (GLYCOLAX) packet 17 g, Daily PRN  acetaminophen (TYLENOL) tablet 650 mg, Q6H PRN   Or  acetaminophen (TYLENOL) suppository 650 mg, Q6H PRN  0.9 % sodium chloride infusion, Continuous  clonazePAM (KLONOPIN) tablet 0.5 mg, BID PRN  cyclobenzaprine (FLEXERIL) tablet 10 mg, TID PRN  atorvastatin (LIPITOR) tablet 40 mg, Nightly  insulin lispro (HUMALOG) injection vial 0-12 Units, TID WC  insulin lispro (HUMALOG) injection vial 0-6 Units, Nightly  glucose (GLUTOSE) 40 % oral gel 15 g, PRN  dextrose 50 % IV solution, PRN  glucagon (rDNA) injection 1 mg, PRN  dextrose 5 % solution, PRN  [START ON 5/22/2021] aspirin chewable tablet 81 mg, Daily  amLODIPine (NORVASC) tablet 2.5 mg, Daily  lisinopril (PRINIVIL;ZESTRIL) tablet 5 mg, Daily  nicotine (NICODERM CQ) 21 MG/24HR 1 patch, Daily  buPROPion (WELLBUTRIN XL) extended release tablet 300 mg, Daily  doxepin (SINEQUAN) capsule 20 mg, Nightly PRN  OXcarbazepine (TRILEPTAL) tablet 150 mg, BID         Social History:  Social History     Tobacco Use   Smoking Status Current Every Day Smoker    Packs/day: 1.50    Years: 41.00    Pack years: 61.50    Types: Cigarettes   Smokeless Tobacco Never Used     Social History     Substance and Sexual Activity   Alcohol Use Not Currently     Social History     Substance and Sexual Activity   Drug Use Not Currently     Legally     Family History:       Problem Relation Age of Onset    Breast Cancer Mother 40    Breast Cancer Maternal Aunt         age unknown    Heart Disease Father     Other Father         pacemaker & stent       Review of Systems:  All systems reviewed and are all negative except what is mentioned in history of present illness. I reviewed the patient PMH,medications, family history, social history. Physical Exam:  BP (!) 156/74   Pulse 88   Temp 98.3 °F (36.8 °C) (Oral)   Resp 18   Ht 5' 4\" (1.626 m)   Wt 246 lb (111.6 kg)   SpO2 95%   BMI 42.23 kg/m²  I Body mass index is 42.23 kg/m².  I   Wt Readings from Last 1 Encounters:   05/21/21 246 lb (111.6 kg)           General appearance - alert, in no distress, oriented to person, place, and time and weight Mental status -Level of Alertness: awake  Orientation: person, place, time  Memory: normal  Fund of Knowledge: normal  Attention/Concentration: normal  Language: normal. Mood is normal  Neck - supple, no neck adenopathy, carotids upstroke normal bilaterally, no carotid bruits. There is no LAP in the neck. There is no thyroid enlargement. Neurological -   Cranial Bvrokq-RN-TRK:   Cranial nerve II: Normal. There is full visual fields  Cranial nerve III: Pupils: equal, round, reactive to light   Cranial nerves III, IV, VI: Extraocular Movements: intact   Cranial nerve V: Facial sensation: decrease sensation on the left lower face  Cranial nerve VII:Facial strength: mild left facial droop  Cranial nerve VIII: Hearing: intact   Cranial nerve IX: Palate Elevation intact bilaterally  Cranial nerve XI: Shoulder shrug intact bilaterally  Cranial nerve XII: Tongue midline   neck supple without rigidity  Motor exam is 5/5 in the upper and lower extremities . Normal muscle tone. There is no muscle atrophy. There is no muscle fasciculation . Sensory is intact   Coordination: finger to nose intact on the right, left finger to nose is ataxic, heel to shin are normal bilaterally  Gait and station not tested  Abnormal movement none. Long tracts are intact   Skin - no rashes or lesions, warm and dry to touch  Superficial temporal artery pulses are normal.   Musculoskeletal: Has no hand arthritis, no limitation of ROM in any of the four extremities, . no joint tenderness, deformity or swelling. There is no leg edema. The Heart was regular in rate and rhythm. No heart murmur  Chest- Clear  Abdomen: soft, intact bowel sounds.         Labs:    CBC:   Recent Labs     05/21/21  0935   WBC 8.6   HGB 15.8      MCV 89.8   MCH 29.9   MCHC 33.3     CMP:  Recent Labs     05/21/21  0935      K 3.8      CO2 23   BUN 9   CREATININE 0.6   LABGLOM >90   GLUCOSE 134*   CALCIUM 9.0     Liver: No results for input(s): AST, ALT, ALKPHOS, PROT, LABALBU, BILITOT in the last 72 hours. Invalid input(s): BILDIR  MRI BRAIN:  No results found for this or any previous visit. No results found for this or any previous visit. Results for orders placed during the hospital encounter of 05/21/21    CTA HEAD W WO CONTRAST    Narrative  PROCEDURE: CTA HEAD W WO CONTRAST    CLINICAL INFORMATION: cva.    COMPARISON: CT scan of the brain dated 21 May 2020. Anuradha Mosley TECHNIQUE: 1 mm CT axial images were obtained through the head after the fast bolus administration of contrast. A noncontrast localizer was obtained. 3-D reconstructions were performed on a dedicated 3-D workstation. These include multiplanar MPR images  and multiplanar MIP images. Isovue intravenous contrast was given. All CT scans at this facility use dose modulation, iterative reconstruction, and/or weight-based dosing when appropriate to reduce radiation dose to as low as reasonably achievable. FINDINGS:      Internal carotid arteries: Antegrade flow in the right and left internal carotid arteries. .    Middle cerebral arteries: Slightly attenuated flow in the distal branches of the right middle cerebral artery. Relatively normal flow in the left middle cerebral artery. Anterior cerebral arteries: Antegrade flow in the anterior cerebral arteries bilaterally    Vertebral arteries: The vertebral arteries are normal.    Basilar artery: Antegrade flow in the basilar artery. Superior cerebellar arteries: Antegrade flow in the right and left superior cerebellar arteries. .    Posterior cerebral arteries: Antegrade flow in the posterior cerebral arteries bilaterally. Patent posterior communicating artery on the right side. No aneurysms, stenoses or occlusions are noted. The superior sagittal sinus, vein of Pieter, internal cerebral veins, straight sinus, transverse sinuses and sigmoid sinuses are patent. There are no suspicious findings on the axial source data. Relatively normal flow in the right and left common and internal carotid arteries. 2. Antegrade flow in the right left vertebral arteries. 3. Atherosclerotic calcification of the origin of the left subclavian artery. No significant narrowing at the origin of the great vessels. 4. Small mediastinal lymph nodes. **This report has been created using voice recognition software. It may contain minor errors which are inherent in voice recognition technology. **    Final report electronically signed by DR Brandy Naqvi on 5/21/2021 12:06 PM    No results found for this or any previous visit. No results found for this or any previous visit. No results found for this or any previous visit. Results for orders placed during the hospital encounter of 05/21/21    CT HEAD WO CONTRAST (CODE STROKE)    Narrative  PROCEDURE: CT HEAD WO CONTRAST    CLINICAL INFORMATION: 24-year-old female with left-sided facial numbness. Code stroke. COMPARISON: None    TECHNIQUE: Noncontrast 5 mm axial images were obtained through the brain. All CT scans at this facility use dose modulation, iterative reconstruction, and/or weight-based dosing when appropriate to reduce radiation dose to as low as reasonably achievable. FINDINGS:    There is no hemorrhage. There are no intra-or extra-axial collections. There is no hydrocephalus, midline shift or mass effect. The gray-white matter differentiation is preserved. The paranasal sinuses and mastoid air cells are normally aerated. There is no suspicious calvarial abnormality. Impression  No acute intracranial hemorrhage, mass effect or missed midline shift. **This report has been created using voice recognition software. It may contain minor errors which are inherent in voice recognition technology. **    Final report electronically signed by Dr Chelsea Licona on 5/21/2021 9:18 AM        We reviewed the patient records and available information in the EHR Impression:    44year old woman with HTN, DM, smoker, obese, p/w acute onset of gait unsteady, left facial droop and left side ataxia. NIHSS 3 ( 1 point left facial droop, 1 point left facial numbness, 1 point left hand ataxia)    Recommendations:    - clinically patient looks she most likely has a small stroke  - however, pt can't get MRI due to her spine stimulator  - agree with telestroke on aspirin 81mg and plavix 75mg daily for total 21 days and after aspirin 81mg daily lifelong  - lipitor 40mg daily with goal LDL less than 70  - PT/OT and rehab consult  - will repeat CT head tomorrow morning and compare it to the one done today to see if there is any changes to help identify stroke, otherwise, we can only make a clinical diagnosis. 1. Discussed with primary service. 2. Please call if any questions. Time spent was at least 64 min of time evaluating patient, discussion with staff,  planning for treatment and evaluation. The time was spent examining patient, reviewing the images personally, reviewing the chart, perform high complexity decision making and speaking with the nursing staff regarding recommendations.      Electronically signed by Norma Hernandez MD on 5/21/2021 at 10:02 PM    Dave Hernandez MD  Attending Neurologist/Neurointensivist

## 2021-05-22 NOTE — PROGRESS NOTES
[address] [address]  PHYSICAL THERAPY MISSED TREATMENT NOTE  ACUTE CARE  [facility] 4A              Missed Treatment  Attempted to see pt and pt dressed on EOB being discharged home. RN reported no need for PT at this time. Will plan to discontinue orders at this time.

## 2021-05-22 NOTE — PROGRESS NOTES
NEUROLOGY INPATIENT PROGRESS NOTE    Mckenna Valle    MRN -  [id number]   [facility] # - [de-identified]      - 1970    44 y.o. Subjective: The patient is seen as follow up for stroke like symptoms. Patient is alert at this time. Objective:   BP (!) 189/90   Pulse 81   Temp 97.6 °F (36.4 °C)   Resp 16   Ht 5' 4\" (1.626 m)   Wt 246 lb (111.6 kg)   SpO2 90%   BMI 42.23 kg/m²       Intake/Output Summary (Last 24 hours) at 2021 1404  Last data filed at 2021 0318  Gross per 24 hour   Intake 1152.7 ml   Output 0 ml   Net 1152.7 ml       Physical Exam:  General:  Awake,  up in chair,  not  in distress. Language is intact. HEENT: pink conjunctiva, unicteric sclera, moist oral mucosa. There is no neck lymphadenopathy. Neck: There is no carotid bruits. The Neck is supple. Neuro: CN 2-12 grossly intact with very mild left facial droop. Power 5/5 Throughout symmetric,   Long tracts are intact. Cerebellar exam is Intact. Sensory exam is intact to light touch. Gait is normal. No abnormal movement. Osteo: There is no LROM of any of the 4 extremity joints, no joint tenderness. Leg edema none  Skin: no lesions, no rash, warm and moist to touch. Abdomen is soft, intact bowel sounds. ROS:    Cardiac: no chest pain. No palpitations.   Renal : no flank pain, no hematuria  Skin: no rash    Medications:     sodium chloride flush  10 mL Intravenous 2 times per day    enoxaparin  40 mg Subcutaneous Q24H    atorvastatin  40 mg Oral Nightly    insulin lispro  0-12 Units Subcutaneous TID     insulin lispro  0-6 Units Subcutaneous Nightly    aspirin  81 mg Oral Daily    amLODIPine  2.5 mg Oral Daily    lisinopril  5 mg Oral Daily    nicotine  1 patch Transdermal Daily    buPROPion  300 mg Oral Daily    OXcarbazepine  150 mg Oral BID       Data:   CBC:   Recent Labs     21  0935 21  0402   WBC 8.6 7.1   HGB 15.8 15.6    201     BMP: Recent Labs     05/21/21  0935 05/22/21  0402    138   K 3.8 3.5    102   CO2 23 23   BUN 9 7   CREATININE 0.6 0.6   GLUCOSE 134* 141*           No results found for this or any previous visit. No results found for this or any previous visit. Results for orders placed during the hospital encounter of 05/21/21    CTA HEAD W WO CONTRAST    Narrative  PROCEDURE: CTA HEAD W WO CONTRAST    CLINICAL INFORMATION: cva.    COMPARISON: CT scan of the brain dated 21 May 2020. Osmar Modi TECHNIQUE: 1 mm CT axial images were obtained through the head after the fast bolus administration of contrast. A noncontrast localizer was obtained. 3-D reconstructions were performed on a dedicated 3-D workstation. These include multiplanar MPR images  and multiplanar MIP images. Isovue intravenous contrast was given. All CT scans at this facility use dose modulation, iterative reconstruction, and/or weight-based dosing when appropriate to reduce radiation dose to as low as reasonably achievable. FINDINGS:      Internal carotid arteries: Antegrade flow in the right and left internal carotid arteries. .    Middle cerebral arteries: Slightly attenuated flow in the distal branches of the right middle cerebral artery. Relatively normal flow in the left middle cerebral artery. Anterior cerebral arteries: Antegrade flow in the anterior cerebral arteries bilaterally    Vertebral arteries: The vertebral arteries are normal.    Basilar artery: Antegrade flow in the basilar artery. Superior cerebellar arteries: Antegrade flow in the right and left superior cerebellar arteries. .    Posterior cerebral arteries: Antegrade flow in the posterior cerebral arteries bilaterally. Patent posterior communicating artery on the right side. No aneurysms, stenoses or occlusions are noted. The superior sagittal sinus, vein of Pieter, internal cerebral veins, straight sinus, transverse sinuses and sigmoid sinuses are patent.     There are no suspicious findings on the axial source data. Impression  1. Slightly attenuated flow in the distal branches of the right middle cerebral artery. Relatively normal flow in the left middle cerebral artery. 2. Relatively normal flow and blood distal vertebral, basilar and posterior cerebral arteries. 3. Relatively normal flow in both internal carotid and anterior cerebral arteries. **This report has been created using voice recognition software. It may contain minor errors which are inherent in voice recognition technology. **    Final report electronically signed by DR Harriet Hernandez on 5/21/2021 12:01 PM    Results for orders placed during the hospital encounter of 05/21/21    CTA NECK W WO CONTRAST    Narrative  PROCEDURE: CTA NECK W WO CONTRAST    CLINICAL INFORMATION: cva.    COMPARISON: No prior study. TECHNIQUE: 1 mm axial images were obtained through the neck after the fast bolus administration of contrast. A noncontrast localizer was obtained. 3-D reconstructions were performed on a dedicated 3-D workstation. These include multiplanar MPR images,  multiplanar MIP images and centerline reconstructions. Isovue intravenous contrast was given. All CT scans at this facility use dose modulation, iterative reconstruction, and/or weight-based dosing when appropriate to reduce radiation dose to as low as reasonably achievable. FINDINGS:      Aortic arch and branches: Atherosclerotic calcification at the origin of the left subclavian artery. Right common carotid artery/ICA: Normal flow in the right common and internal carotid arteries. Left common carotid artery/ICA: Normal flow in the left common and internal carotid arteries. Vertebral arteries: Antegrade flow in the right and left vertebral arteries. Intracranial cerebral circulation: Grossly unremarkable. Axial source data: Small mediastinal lymph nodes. Mild prominence of the right and left pulmonary arteries.  Straightening of the normal cervical lordosis. Impression  1. Relatively normal flow in the right and left common and internal carotid arteries. 2. Antegrade flow in the right left vertebral arteries. 3. Atherosclerotic calcification of the origin of the left subclavian artery. No significant narrowing at the origin of the great vessels. 4. Small mediastinal lymph nodes. **This report has been created using voice recognition software. It may contain minor errors which are inherent in voice recognition technology. **    Final report electronically signed by DR Luciano Salter on 5/21/2021 12:06 PM    No results found for this or any previous visit. No results found for this or any previous visit. No results found for this or any previous visit. Results for orders placed during the hospital encounter of 05/21/21    CT HEAD WO CONTRAST    Narrative  PROCEDURE: CT HEAD WO CONTRAST    CLINICAL INFORMATION: Left facial droop    TECHNIQUE: CT scan of the head was performed from the vertex to the skull base without use of intravenous contrast. Axial images as well as coronal and sagittal reconstructions were obtained. All CT scans at this facility use dose modulation, iterative reconstruction, and/or weight-based dosing when appropriate to reduce radiation dose to as low as reasonably achievable. COMPARISON: CT head 5/21/2021    FINDINGS: There is no mass effect, midline shift or acute hemorrhage. Ventricles and CSF spaces are within normal limits. Gray-white matter differentiation is preserved. Visualized orbits, paranasal sinuses and mastoid air cells are unremarkable. Impression  No mass effect or acute hemorrhage. **This report has been created using voice recognition software. It may contain minor errors which are inherent in voice recognition technology. **    Final report electronically signed by Dr. Maxcine Castleman on 5/22/2021 1:21 PM        Assessment:  Active Problems:    Neurological abnormality  Resolved Problems:    * No resolved hospital problems. *      52 year old woman with HTN, DM, smoker, obese, p/w acute onset of gait unsteady, left facial droop and left side ataxia that now almost resolved.       Plan:    1. Pt most likely had a small stroke based on clinical exam, however, she can't get MRI brain, repeat CT head is unremarkable, however, small stroke is cannot be easily seen on CT head  2. con't aspirin 81mg daily lifelong  3. con't plavix 75mg daily for 21 days  4. con't lipitor 40mg daily with goal LDL less than 70  5. Patient can be discharged today  6. See me in 3 months for follow up  7.  Smoking cessation advice given    Oliver Sanchez MD, MD, 5/22/2021 2:04 PM       Peewee Yeboah MD  Attending Neurologist/Neurointensivist

## 2021-05-24 ENCOUNTER — TELEPHONE (OUTPATIENT)
Dept: FAMILY MEDICINE CLINIC | Age: 51
End: 2021-05-24

## 2021-05-24 NOTE — TELEPHONE ENCOUNTER
[facility] Transitions Initial Follow Up Call    Call within 2 business days of discharge: Yes     Patient: Devorah Dawson Patient : 1970 MRN: [id number]      RARS: Readmission Risk Score: 12       Spoke with: patient    Discharge department/facility: [facility]    Non-face-to-face services provided:  Scheduled appointment with PCP-VV scheduled with patient as she is unsure about transportation for an in person visit. Will plan on discussing orders for outpatient therapy and possible follow up with neuro. Will call office prior to appt if anything further needed.      Follow Up  Future Appointments   Date Time Provider Dedrick Fuller   2021  2:30 PM NAIF Arellano - CNP [facility] [place]   2021  2:00 PM Claus Kenyon, [address]   2021  2:00 PM MD ILIA Reyna SRP[facility] Heart [facility] [place]   2021  2:00 PM Diana Stevens RN [facility] Physic [facility] [place]   2021  1:30 PM NAIF Arellano - CNP Fam Medicine [facility] [place]   2021  2:00 PM DO ILIA Reza Pain [facility] - Flora Mack RN

## 2021-05-25 DIAGNOSIS — E11.65 UNCONTROLLED TYPE 2 DIABETES MELLITUS WITH HYPERGLYCEMIA (HCC): ICD-10-CM

## 2021-05-25 RX ORDER — METFORMIN HYDROCHLORIDE 500 MG/1
TABLET, EXTENDED RELEASE ORAL
Qty: 120 TABLET | Refills: 1 | Status: SHIPPED | OUTPATIENT
Start: 2021-05-25 | End: 2021-06-18

## 2021-05-26 ENCOUNTER — OFFICE VISIT (OUTPATIENT)
Dept: FAMILY MEDICINE CLINIC | Age: 51
End: 2021-05-26
Payer: MEDICARE

## 2021-05-26 DIAGNOSIS — R20.0 LEFT FACIAL NUMBNESS: ICD-10-CM

## 2021-05-26 DIAGNOSIS — R29.818 NEUROLOGICAL ABNORMALITY: Primary | ICD-10-CM

## 2021-05-26 DIAGNOSIS — I63.9 STROKE DETERMINED BY CLINICAL ASSESSMENT (HCC): ICD-10-CM

## 2021-05-26 PROCEDURE — 99495 TRANSJ CARE MGMT MOD F2F 14D: CPT | Performed by: NURSE PRACTITIONER

## 2021-05-26 PROCEDURE — 1111F DSCHRG MED/CURRENT MED MERGE: CPT | Performed by: NURSE PRACTITIONER

## 2021-05-26 ASSESSMENT — ENCOUNTER SYMPTOMS
WHEEZING: 0
SHORTNESS OF BREATH: 0
DIARRHEA: 0
COLOR CHANGE: 0
NAUSEA: 0
FACIAL SWELLING: 0
COUGH: 0
ABDOMINAL PAIN: 0
TROUBLE SWALLOWING: 0
SINUS PAIN: 0
SORE THROAT: 0
VOMITING: 0
BACK PAIN: 0

## 2021-05-26 NOTE — PROGRESS NOTES
[facility]  [address] [facility] [address]  Dept: [phone]  Dept Fax: [phone]  Loc: [phone]    Done Via Video Visit- See disclaimer below. Post-Discharge Transitional Care Management Services or Hospital Follow Up      Norma Rivero   YOB: 1970    Date of Office Visit:  5/26/2021  Date of Hospital Admission: 5/21/21  Date of Hospital Discharge: 5/22/21  Readmission Risk Score(high >=14%.  Medium >=10%):Readmission Risk Score: 12      Care management risk score Rising risk (score 2-5) and Complex Care (Scores >=6): 2     Non face to face  following discharge, date last encounter closed (first attempt may have been earlier): 5/24/2021  4:57 PM 5/24/2021  4:57 PM    Call initiated 2 business days of discharge: Yes     Patient Active Problem List   Diagnosis    Bipolar affective disorder ([address]    Carpal tunnel syndrome    Chronic pulmonary heart disease ([address]    COPD (chronic obstructive pulmonary disease) ([address]    DDD (degenerative disc disease), lumbar    DM (diabetes mellitus), type 2, uncontrolled ([address]    Elevated LFTs    Essential hypertension    GERD (gastroesophageal reflux disease)    Hyperlipidemia    Sleep apnea    Spinal stenosis    Symptomatic menopausal or female climacteric states    Tobacco use disorder    Vitamin D deficiency    Anxiety state    Neurological abnormality    Left facial numbness    Stroke determined by clinical assessment ([address]       Allergies   Allergen Reactions    Adhesive Tape     Penicillins        Medications listed as ordered at the time of discharge from hospital   Jaswinder House Courser \"Marie\"   Home Medication Instructions RL:    Printed on:05/26/21 1500   Medication Information                      amLODIPine (NORVASC) 5 MG tablet  Take 1 tablet by mouth daily             aspirin 81 MG chewable tablet  Take 1 tablet by mouth daily             atenolol (TENORMIN) 50 MG tablet  Take 1 tablet by mouth 2 times daily             atorvastatin (LIPITOR) 40 MG tablet  TAKE 1 TABLET BY MOUTH EVERY DAY             b complex vitamins capsule  Take 1 capsule by mouth daily             buPROPion (WELLBUTRIN XL) 300 MG extended release tablet  Take 300 mg by mouth every morning             clonazePAM (KLONOPIN) 0.5 MG tablet  Take 0.5 mg by mouth 2 times daily as needed. clopidogrel (PLAVIX) 75 MG tablet  Take 1 tablet by mouth daily for 21 days             dicyclomine (BENTYL) 20 MG tablet  Take 20 mg by mouth 3 times daily as needed             doxepin (SINEQUAN) 10 MG capsule  Take 20 mg by mouth nightly              glimepiride (AMARYL) 2 MG tablet  Take 1 tablet by mouth every morning             Handicap Placard MISC  by Does not apply route Valid for 5 years. insulin aspart (NOVOLOG FLEXPEN) 100 UNIT/ML injection pen  Inject 12 Units into the skin 3 times daily (before meals) Inject 12 units before breakfast and lunch and 24 units before dinner. Insulin Degludec (TRESIBA) 100 UNIT/ML SOLN  Inject 55 Units into the skin nightly             lisinopril (PRINIVIL;ZESTRIL) 30 MG tablet  TAKE 1 TABLET BY MOUTH EVERY DAY             Melatonin 10 MG TABS  Take 10 mg by mouth nightly as needed             metFORMIN (GLUCOPHAGE-XR) 500 MG extended release tablet  TAKE 2 TABLETS BY MOUTH TWICE A DAY             nicotine (NICODERM CQ) 21 MG/24HR  Place 1 patch onto the skin daily             OXcarbazepine (TRILEPTAL) 150 MG tablet  Take 150 mg by mouth 2 times daily              VITAMIN D, CHOLECALCIFEROL, PO  Take 1 tablet by mouth daily                   Medications marked \"taking\" at this time  No outpatient medications have been marked as taking for the 5/26/21 encounter (Office Visit) with NAIF Garcia CNP.         Medications patient taking as of now reconciled against medications ordered at time of hospital discharge: Yes    Chief Complaint   Patient presents with    Follow-Up from [facility] Cerebrovascular Accident       HPI    Inpatient course: Discharge summary reviewed- see chart. Assessment:  Active Problems:    Neurological abnormality  Resolved Problems:    * No resolved hospital problems. *        44year old woman with HTN, DM, smoker, obese, p/w acute onset of gait unsteady, left facial droop and left side ataxia that now almost resolved.        Plan:     1. Pt most likely had a small stroke based on clinical exam, however, she can't get MRI brain, repeat CT head is unremarkable, however, small stroke is cannot be easily seen on CT head  2. con't aspirin 81mg daily lifelong  3. con't plavix 75mg daily for 21 days  4. con't lipitor 40mg daily with goal LDL less than 70  5. Patient can be discharged today  6. See me in 3 months for follow up  7. Smoking cessation advice given     Norma Hernandez MD, MD, 5/22/2021 2:04 PM         Dave Hernandez MD  Attending Neurologist/Neurointensivist    Interval history/Current status: Doing OK since being home. Still having some left sided weakness and at times her speech is slurred. She was started on ASA, plavix and lipitor in the hospital.  Doing OK on these medications. She does need a referral for home health and therapy. She also needs a referral to Dr Lea Mora for neurology and follow up appt in 3 weeks. Review of Systems   Constitutional: Positive for fatigue. Negative for chills and fever. HENT: Negative for congestion, facial swelling, sinus pain, sore throat and trouble swallowing. Respiratory: Negative for cough, shortness of breath and wheezing. Cardiovascular: Positive for leg swelling. Negative for chest pain and palpitations. Gastrointestinal: Negative for abdominal pain, diarrhea, nausea and vomiting. Genitourinary: Negative for difficulty urinating, dysuria and urgency. Musculoskeletal: Negative for back pain, gait problem and neck pain.    Skin: Negative for color change and rash. Neurological: Positive for dizziness, speech difficulty and weakness (left side). Negative for headaches. Psychiatric/Behavioral: Negative for agitation and sleep disturbance. The patient is nervous/anxious. There were no vitals filed for this visit. There is no height or weight on file to calculate BMI. Wt Readings from Last 3 Encounters:   05/21/21 246 lb (111.6 kg)   04/26/21 248 lb (112.5 kg)   04/12/21 248 lb 3.2 oz (112.6 kg)     BP Readings from Last 3 Encounters:   05/22/21 (!) 189/90   04/26/21 136/72   04/12/21 (!) 138/92       Physical Exam  Vitals reviewed. Constitutional:       General: She is not in acute distress. Appearance: She is well-developed. HENT:      Head: Normocephalic and atraumatic. Pulmonary:      Effort: Pulmonary effort is normal. No respiratory distress. Skin:     General: Skin is warm and dry. Neurological:      General: No focal deficit present. Mental Status: She is alert and oriented to person, place, and time. Coordination: Coordination normal.   Psychiatric:         Mood and Affect: Mood normal.         Behavior: Behavior normal.         Thought Content: Thought content normal.         Judgment: Judgment normal.             Assessment/Plan:  1. Neurological abnormality  - HI DISCHARGE MEDS RECONCILED W/ CURRENT OUTPATIENT MED LIST  - External Referral To Neurology  [address]    2. Left facial numbness  - External Referral To Neurology  [address]    3. Stroke determined by clinical assessment [facility])  - External Referral To Neurology  - [address] Decision Making: moderate complexity        Saskia Sanchez, was evaluated through a synchronous (real-time) audio-video encounter. The patient (or guardian if applicable) is aware that this is a billable service. Verbal consent to proceed has been obtained within the past 12 months.  The

## 2021-06-10 ENCOUNTER — PATIENT MESSAGE (OUTPATIENT)
Dept: FAMILY MEDICINE CLINIC | Age: 51
End: 2021-06-10

## 2021-06-10 NOTE — TELEPHONE ENCOUNTER
From: Xu Smith  To: NAIF Walker - CNP  Sent: 6/10/2021 3:10 PM EDT  Subject: Prescription Question    I need my dicyclomine refilled, its not on my medication list. I take it 3 times a day as needed for spastic colon. 20mg tablets. Thank you.

## 2021-06-11 RX ORDER — CYCLOBENZAPRINE HCL 10 MG
10 TABLET ORAL 3 TIMES DAILY PRN
Qty: 30 TABLET | Refills: 1 | Status: SHIPPED | OUTPATIENT
Start: 2021-06-11

## 2021-06-11 RX ORDER — DICYCLOMINE HCL 20 MG
20 TABLET ORAL 3 TIMES DAILY PRN
Qty: 120 TABLET | Refills: 1 | Status: SHIPPED | OUTPATIENT
Start: 2021-06-11 | End: 2021-07-06

## 2021-06-17 DIAGNOSIS — E11.65 UNCONTROLLED TYPE 2 DIABETES MELLITUS WITH HYPERGLYCEMIA (HCC): ICD-10-CM

## 2021-06-18 RX ORDER — METFORMIN HYDROCHLORIDE 500 MG/1
1000 TABLET, EXTENDED RELEASE ORAL 2 TIMES DAILY
Qty: 360 TABLET | Refills: 3 | Status: SHIPPED | OUTPATIENT
Start: 2021-06-18

## 2021-06-22 ENCOUNTER — PATIENT MESSAGE (OUTPATIENT)
Dept: FAMILY MEDICINE CLINIC | Age: 51
End: 2021-06-22

## 2021-06-22 RX ORDER — KETOCONAZOLE 20 MG/G
CREAM TOPICAL
Qty: 30 G | Refills: 1 | Status: SHIPPED | OUTPATIENT
Start: 2021-06-22

## 2021-06-22 NOTE — TELEPHONE ENCOUNTER
Responded to pt via My Chart message. Follow up as needed. -WS    Orders Placed This Encounter   Medications    ketoconazole (NIZORAL) 2 % cream     Sig: Apply topically daily.      Dispense:  30 g     Refill:  1

## 2021-06-22 NOTE — TELEPHONE ENCOUNTER
From: Dejuan Both  To: Jeremy Sanchez, APRN - CNP  Sent: 6/22/2021 5:46 AM EDT  Subject: Prescription Question    Hi,  I need a script for a tube of Ketoconazole cream, Kristin misplaced the tube I had. I have a ringworm infection starting on my abdomen right in the crease where my skin touches. 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 I dont have transport to come in for an appointment. Can it just be sent to the [facility]? I can get someone to pick it up today. I move Friday.   Thank you,  Chalino Ballard

## 2021-06-25 ENCOUNTER — TELEPHONE (OUTPATIENT)
Dept: SPIRITUAL SERVICES | Facility: CLINIC | Age: 51
End: 2021-06-25

## 2021-07-06 ENCOUNTER — TELEPHONE (OUTPATIENT)
Dept: NEUROLOGY | Age: 51
End: 2021-07-06

## 2021-07-06 RX ORDER — DICYCLOMINE HCL 20 MG
TABLET ORAL
Qty: 90 TABLET | Refills: 0 | Status: SHIPPED | OUTPATIENT
Start: 2021-07-06

## 2021-07-06 NOTE — TELEPHONE ENCOUNTER
Request sent from [facility] pharmacy for refill of dicyclomine 20 mg tid prn. Last seen 5/26/21, no future appt scheduled.    Barry Davis for short term supply until patient finds new physician? (moved to [place] recently per last few messages)

## 2021-07-06 NOTE — TELEPHONE ENCOUNTER
Patient notified Dr Siva Valero will no longer be practicing at [facility] and will only be seeing patients at [facility]. Patient stated she moved to [place] and will be following up with neurologist there.

## 2021-07-22 DIAGNOSIS — I10 ESSENTIAL HYPERTENSION: ICD-10-CM

## 2021-07-22 RX ORDER — ATENOLOL 50 MG/1
50 TABLET ORAL 2 TIMES DAILY
Qty: 60 TABLET | Refills: 1 | Status: SHIPPED | OUTPATIENT
Start: 2021-07-22

## 2021-08-13 ENCOUNTER — TELEPHONE (OUTPATIENT)
Dept: SPIRITUAL SERVICES | Facility: CLINIC | Age: 51
End: 2021-08-13

## 2021-08-13 NOTE — TELEPHONE ENCOUNTER
During my follow up contact with Malena/  \"Marie\", she shared that she had moved to [facility] and her transition has been beneficial to her recovery. She stated that she was feeling much better and will follow up with her neurologist concerning her past diagnosis of stroke. I offered words of encouragement and prayer. No further follow up is needed at this time.

## 2021-08-25 ENCOUNTER — PATIENT MESSAGE (OUTPATIENT)
Dept: FAMILY MEDICINE CLINIC | Age: 51
End: 2021-08-25

## 2021-08-25 NOTE — TELEPHONE ENCOUNTER
From: Willa Valle  To: Suze Min, APRN - CNP  Sent: 8/25/2021 1:38 PM EDT  Subject: Visit Follow-Up Question    Hi,     My insurance is showing that they paid a claim for a visit on 8/6/2021, but I wasnt present. Since I have moved to [place] as of June 26, 2021, I would like to know what this is for. Please advise.   Thank you

## 2021-09-03 NOTE — TELEPHONE ENCOUNTER
Spoke with pt via phone . We believe the charge was from [facility] paperwork that was sent and signed on 8-6-21. Pt notified.  No concerns or issues

## 2023-01-17 ENCOUNTER — PATIENT MESSAGE (OUTPATIENT)
Dept: FAMILY MEDICINE CLINIC | Age: 53
End: 2023-01-17

## 2023-01-20 NOTE — TELEPHONE ENCOUNTER
From: Papito French  To: Mele Sutherland  Sent: 1/20/2023 12:00 AM EST  Subject: Happy New Year from [address] [facility]  [address], [address]  Phone: [phone]  Fax: [phone],    Happy New Year! Our staff works hard to provide you excellent care. Please know we take extra efforts to ensure your health and safety from the moment you arrive. Our office provides hand , masks, and access to hand washing. Staff at our  may ask you for your updated insurance card and photo ID to make sure your information is correct. Please be prepared to provide this information at the time of check-in as well as pay any insurance copays or past due balance. Before your visit you can Pre-visit check in which is helpful to you as well as the staff. We want to make your visit as seamless as we can. Respectfully,  [address] [facility] Staff    WHAT IS VISIT PRE-CHECK? Visit Pre-Check saves time by letting you complete your appointment paperwork and pay your copay in advance of your appointment. 1.After signing into My [place] [facility], open My Appointments  2. Within 3 days of your scheduled appointment, the Visit Pre-Check button will appear  3. Select Visit Pre-Check  4. Verify or update your personal information  Demographics and address  Known allergies  Current medications, including preferred pharmacy  List of current health issues  Insurance coverage, including a photo of your insurance card  5. Sign your electronic consents such as Notice of Privacy Practices, Consent to Treat, Financial and Information Sharing  6. Pay your copay and outstanding balances, if eligible  7. Verify or update your patient health information  8. Submit and you are all ready for your appointment